# Patient Record
Sex: MALE | Race: WHITE | NOT HISPANIC OR LATINO | Employment: OTHER | URBAN - METROPOLITAN AREA
[De-identification: names, ages, dates, MRNs, and addresses within clinical notes are randomized per-mention and may not be internally consistent; named-entity substitution may affect disease eponyms.]

---

## 2021-08-27 ENCOUNTER — OFFICE VISIT (OUTPATIENT)
Dept: CARDIOLOGY | Facility: CLINIC | Age: 66
End: 2021-08-27

## 2021-08-27 VITALS
DIASTOLIC BLOOD PRESSURE: 89 MMHG | BODY MASS INDEX: 35.24 KG/M2 | SYSTOLIC BLOOD PRESSURE: 133 MMHG | WEIGHT: 224.5 LBS | HEIGHT: 67 IN | HEART RATE: 47 BPM | OXYGEN SATURATION: 98 %

## 2021-08-27 DIAGNOSIS — R06.02 SHORTNESS OF BREATH: ICD-10-CM

## 2021-08-27 DIAGNOSIS — G47.33 OBSTRUCTIVE SLEEP APNEA: ICD-10-CM

## 2021-08-27 DIAGNOSIS — I25.118 CORONARY ARTERY DISEASE OF NATIVE ARTERY OF NATIVE HEART WITH STABLE ANGINA PECTORIS (HCC): ICD-10-CM

## 2021-08-27 DIAGNOSIS — E78.2 MIXED HYPERLIPIDEMIA: ICD-10-CM

## 2021-08-27 DIAGNOSIS — I20.9 ANGINA PECTORIS (HCC): Primary | ICD-10-CM

## 2021-08-27 DIAGNOSIS — I10 ESSENTIAL HYPERTENSION: ICD-10-CM

## 2021-08-27 PROCEDURE — 93000 ELECTROCARDIOGRAM COMPLETE: CPT | Performed by: INTERNAL MEDICINE

## 2021-08-27 PROCEDURE — 99204 OFFICE O/P NEW MOD 45 MIN: CPT | Performed by: INTERNAL MEDICINE

## 2021-08-27 RX ORDER — OMEPRAZOLE 40 MG/1
CAPSULE, DELAYED RELEASE ORAL
COMMUNITY
Start: 2021-06-19 | End: 2022-04-04 | Stop reason: ALTCHOICE

## 2021-08-27 RX ORDER — BUPROPION HYDROCHLORIDE 300 MG/1
300 TABLET ORAL DAILY
COMMUNITY
Start: 2021-07-01

## 2021-08-27 RX ORDER — TEMAZEPAM 15 MG/1
15 CAPSULE ORAL
COMMUNITY
Start: 2021-08-05

## 2021-08-27 RX ORDER — BENAZEPRIL HYDROCHLORIDE 20 MG/1
1 TABLET ORAL DAILY
COMMUNITY
Start: 2021-06-19

## 2021-08-27 RX ORDER — FUROSEMIDE 20 MG/1
20 TABLET ORAL DAILY
COMMUNITY
Start: 2021-08-05

## 2021-08-27 RX ORDER — ASPIRIN 81 MG/1
81 TABLET ORAL DAILY
COMMUNITY

## 2021-08-27 RX ORDER — TAMSULOSIN HYDROCHLORIDE 0.4 MG/1
1 CAPSULE ORAL DAILY
COMMUNITY

## 2021-08-27 RX ORDER — METOPROLOL SUCCINATE 50 MG/1
50 TABLET, EXTENDED RELEASE ORAL DAILY
COMMUNITY
Start: 2021-08-06

## 2021-08-27 NOTE — PROGRESS NOTES
"    Subjective:     Encounter Date:08/27/2021      Patient ID: Manuel Rolle is a 66 y.o. male.    Chief Complaint: Consult  History of Present Illness 66-year-old white male with history of recent diagnosed coronary disease hypertension hyperlipidemia sleep apnea presents to my office for a new consultation and a second opinion.  Patient had recent cardiac catheterization the findings of which are not known yet but he had some blockages according to him.  Patient still continues to have symptoms of chest pain or shortness of breath especially with exertion relieved with rest.  Chest pain is mostly left-sided without any radiation.  No complains any PND orthopnea.  No palpitation dizziness syncope or swelling of the feet.  Patient initially had a CT scan which showed a very high calcium score and had a treadmill test which was not abnormal but he had to have cardiac intervention because of his symptoms.  He is taking his meds regularly.  He does not smoke.  /89 (BP Location: Left arm, Patient Position: Sitting, Cuff Size: Adult)   Pulse (!) 47   Ht 170.2 cm (67\")   Wt 102 kg (224 lb 8 oz)   SpO2 98%   BMI 35.16 kg/m²     The following portions of the patient's history were reviewed and updated as appropriate: allergies, current medications, past family history, past medical history, past social history, past surgical history and problem list.  Past Medical History:   Diagnosis Date   • Hypertension      Past Surgical History:   Procedure Laterality Date   • ANKLE SURGERY     • CARDIAC CATHETERIZATION     • COLONOSCOPY  2019   • HERNIA REPAIR       Social History     Socioeconomic History   • Marital status:      Spouse name: Not on file   • Number of children: Not on file   • Years of education: Not on file   • Highest education level: Not on file   Tobacco Use   • Smoking status: Former Smoker   • Smokeless tobacco: Never Used   Vaping Use   • Vaping Use: Never used   Substance and Sexual Activity "   • Alcohol use: Yes     Comment: 1-2 a week   • Drug use: Never   • Sexual activity: Defer     Family History   Problem Relation Age of Onset   • Heart disease Mother    • Hypertension Father        Current Outpatient Medications:   •  aspirin 81 MG EC tablet, Take 81 mg by mouth Daily., Disp: , Rfl:   •  benazepril (LOTENSIN) 20 MG tablet, Take 1 tablet by mouth Daily., Disp: , Rfl:   •  buPROPion XL (WELLBUTRIN XL) 300 MG 24 hr tablet, Take 300 mg by mouth Daily., Disp: , Rfl:   •  furosemide (LASIX) 20 MG tablet, Take 20 mg by mouth Daily., Disp: , Rfl:   •  metoprolol succinate XL (TOPROL-XL) 50 MG 24 hr tablet, Take 50 mg by mouth Daily., Disp: , Rfl:   •  omeprazole (priLOSEC) 40 MG capsule, , Disp: , Rfl:   •  tamsulosin (FLOMAX) 0.4 MG capsule 24 hr capsule, Take 1 capsule by mouth Daily., Disp: , Rfl:   •  temazepam (RESTORIL) 15 MG capsule, Take 15 mg by mouth every night at bedtime., Disp: , Rfl:   No Known Allergies    Review of Systems   Constitutional: Positive for malaise/fatigue. Negative for fever.   HENT: Negative for congestion and hearing loss.    Eyes: Negative for double vision and visual disturbance.   Cardiovascular: Positive for chest pain. Negative for claudication, dyspnea on exertion, leg swelling and syncope.   Respiratory: Positive for shortness of breath. Negative for cough.    Endocrine: Negative for cold intolerance.   Skin: Negative for color change and rash.   Musculoskeletal: Negative for arthritis and joint pain.   Gastrointestinal: Positive for nausea. Negative for abdominal pain and heartburn.   Genitourinary: Negative for hematuria.   Neurological: Positive for numbness (left arm). Negative for excessive daytime sleepiness and dizziness.   Psychiatric/Behavioral: Negative for depression. The patient is not nervous/anxious.    All other systems reviewed and are negative.             Objective:     Constitutional:       Appearance: Well-developed.   Eyes:      General: No  scleral icterus.     Conjunctiva/sclera: Conjunctivae normal.   HENT:      Head: Normocephalic and atraumatic.   Neck:      Vascular: No carotid bruit or JVD.   Pulmonary:      Effort: Pulmonary effort is normal.      Breath sounds: Normal breath sounds. No wheezing. No rales.   Cardiovascular:      Normal rate. Regular rhythm.   Pulses:     Intact distal pulses.   Abdominal:      General: Bowel sounds are normal.      Palpations: Abdomen is soft.   Musculoskeletal:      Cervical back: Normal range of motion and neck supple. Skin:     General: Skin is warm and dry.      Findings: No rash.   Neurological:      Mental Status: Alert.           ECG 12 Lead    Date/Time: 8/27/2021 3:04 PM  Performed by: Celso Nichole MD  Authorized by: Celso Nichole MD   Comments: Sinus bradycardia  Abnormal EKG  No previous EKG available            Lab Review:       Assessment:          Diagnosis Plan   1. Angina pectoris (CMS/HCC)     2. Coronary artery disease of native artery of native heart with stable angina pectoris (CMS/HCC)     3. Essential hypertension     4. Mixed hyperlipidemia     5. Obstructive sleep apnea     6. Shortness of breath            Plan:     Patient has been having symptoms of chest pain and shortness of breath and had had a cardiac intervention which showed disease  I will review his films and then decide about medical therapy versus stent placement  Patient is currently on medical therapy with aspirin and medications for blood pressure but is not on a statin  Patient will be placed on low-dose statins even if he is cholesterol is within normal meds because of his high calcium score and because of his family history  Patient has sleep apnea and will need a CPAP machine.

## 2021-08-31 ENCOUNTER — TELEPHONE (OUTPATIENT)
Dept: CARDIOLOGY | Facility: CLINIC | Age: 66
End: 2021-08-31

## 2022-03-29 ENCOUNTER — TELEPHONE (OUTPATIENT)
Dept: CARDIOLOGY | Facility: CLINIC | Age: 67
End: 2022-03-29

## 2022-03-31 ENCOUNTER — TRANSCRIBE ORDERS (OUTPATIENT)
Dept: ADMINISTRATIVE | Facility: HOSPITAL | Age: 67
End: 2022-03-31

## 2022-03-31 ENCOUNTER — LAB (OUTPATIENT)
Dept: LAB | Facility: HOSPITAL | Age: 67
End: 2022-03-31

## 2022-03-31 DIAGNOSIS — Z01.818 PRE-OP EXAMINATION: ICD-10-CM

## 2022-03-31 DIAGNOSIS — Z01.818 PRE-OP EXAMINATION: Primary | ICD-10-CM

## 2022-03-31 LAB
ANION GAP SERPL CALCULATED.3IONS-SCNC: 9 MMOL/L (ref 5–15)
BASOPHILS # BLD AUTO: 0.02 10*3/MM3 (ref 0–0.2)
BASOPHILS NFR BLD AUTO: 0.3 % (ref 0–1.5)
BUN SERPL-MCNC: 15 MG/DL (ref 8–23)
BUN/CREAT SERPL: 11.9 (ref 7–25)
CALCIUM SPEC-SCNC: 9.4 MG/DL (ref 8.6–10.5)
CHLORIDE SERPL-SCNC: 105 MMOL/L (ref 98–107)
CO2 SERPL-SCNC: 28 MMOL/L (ref 22–29)
CREAT SERPL-MCNC: 1.26 MG/DL (ref 0.76–1.27)
DEPRECATED RDW RBC AUTO: 45.1 FL (ref 37–54)
EGFRCR SERPLBLD CKD-EPI 2021: 62.9 ML/MIN/1.73
EOSINOPHIL # BLD AUTO: 0.13 10*3/MM3 (ref 0–0.4)
EOSINOPHIL NFR BLD AUTO: 1.7 % (ref 0.3–6.2)
ERYTHROCYTE [DISTWIDTH] IN BLOOD BY AUTOMATED COUNT: 14.4 % (ref 12.3–15.4)
GLUCOSE SERPL-MCNC: 80 MG/DL (ref 65–99)
HCT VFR BLD AUTO: 46.9 % (ref 37.5–51)
HGB BLD-MCNC: 15.1 G/DL (ref 13–17.7)
IMM GRANULOCYTES # BLD AUTO: 0.01 10*3/MM3 (ref 0–0.05)
IMM GRANULOCYTES NFR BLD AUTO: 0.1 % (ref 0–0.5)
LYMPHOCYTES # BLD AUTO: 2.18 10*3/MM3 (ref 0.7–3.1)
LYMPHOCYTES NFR BLD AUTO: 28.6 % (ref 19.6–45.3)
MCH RBC QN AUTO: 27.9 PG (ref 26.6–33)
MCHC RBC AUTO-ENTMCNC: 32.2 G/DL (ref 31.5–35.7)
MCV RBC AUTO: 86.5 FL (ref 79–97)
MONOCYTES # BLD AUTO: 0.67 10*3/MM3 (ref 0.1–0.9)
MONOCYTES NFR BLD AUTO: 8.8 % (ref 5–12)
NEUTROPHILS NFR BLD AUTO: 4.6 10*3/MM3 (ref 1.7–7)
NEUTROPHILS NFR BLD AUTO: 60.5 % (ref 42.7–76)
NRBC BLD AUTO-RTO: 0 /100 WBC (ref 0–0.2)
PLATELET # BLD AUTO: 176 10*3/MM3 (ref 140–450)
PMV BLD AUTO: 12.2 FL (ref 6–12)
POTASSIUM SERPL-SCNC: 4.4 MMOL/L (ref 3.5–5.2)
RBC # BLD AUTO: 5.42 10*6/MM3 (ref 4.14–5.8)
SODIUM SERPL-SCNC: 142 MMOL/L (ref 136–145)
WBC NRBC COR # BLD: 7.61 10*3/MM3 (ref 3.4–10.8)

## 2022-03-31 PROCEDURE — 85025 COMPLETE CBC W/AUTO DIFF WBC: CPT

## 2022-03-31 PROCEDURE — 36415 COLL VENOUS BLD VENIPUNCTURE: CPT

## 2022-03-31 PROCEDURE — 80048 BASIC METABOLIC PNL TOTAL CA: CPT

## 2022-04-04 ENCOUNTER — OFFICE VISIT (OUTPATIENT)
Dept: CARDIOLOGY | Facility: CLINIC | Age: 67
End: 2022-04-04

## 2022-04-04 VITALS
DIASTOLIC BLOOD PRESSURE: 91 MMHG | BODY MASS INDEX: 35.9 KG/M2 | HEART RATE: 50 BPM | WEIGHT: 228.75 LBS | SYSTOLIC BLOOD PRESSURE: 147 MMHG | OXYGEN SATURATION: 97 % | HEIGHT: 67 IN

## 2022-04-04 DIAGNOSIS — I25.10 CORONARY ARTERY DISEASE INVOLVING NATIVE CORONARY ARTERY OF NATIVE HEART WITHOUT ANGINA PECTORIS: Primary | ICD-10-CM

## 2022-04-04 DIAGNOSIS — G47.33 OBSTRUCTIVE SLEEP APNEA: ICD-10-CM

## 2022-04-04 DIAGNOSIS — I10 PRIMARY HYPERTENSION: ICD-10-CM

## 2022-04-04 PROCEDURE — 93000 ELECTROCARDIOGRAM COMPLETE: CPT | Performed by: INTERNAL MEDICINE

## 2022-04-04 PROCEDURE — 99213 OFFICE O/P EST LOW 20 MIN: CPT | Performed by: INTERNAL MEDICINE

## 2022-04-04 RX ORDER — MELOXICAM 15 MG/1
15 TABLET ORAL DAILY
COMMUNITY
Start: 2022-03-29 | End: 2022-04-26 | Stop reason: HOSPADM

## 2022-04-04 RX ORDER — METHOCARBAMOL 750 MG/1
750 TABLET, FILM COATED ORAL 4 TIMES DAILY PRN
COMMUNITY
Start: 2022-03-29

## 2022-04-04 RX ORDER — SODIUM PHOSPHATE,MONO-DIBASIC 19G-7G/118
ENEMA (ML) RECTAL
COMMUNITY

## 2022-04-04 RX ORDER — PANTOPRAZOLE SODIUM 40 MG/1
40 TABLET, DELAYED RELEASE ORAL DAILY
COMMUNITY
Start: 2022-02-02

## 2022-04-04 NOTE — PROGRESS NOTES
"    Subjective:     Encounter Date:04/04/2022      Patient ID: Manuel Rolle is a 66 y.o. male.    Chief Complaint:  History of Present Illness 66-year-old white male with history of coronary disease history of hypertension presents to my office for follow-up.  Patient is currently stable without any symptoms of chest pain or shortness of breath at rest or exertion.  No complains of any PND orthopnea.  No palpitation dizziness syncope he has some swelling of the feet but he is taking his medicine regularly but he does not smoke.    The following portions of the patient's history were reviewed and updated as appropriate: allergies, current medications, past family history, past medical history, past social history, past surgical history and problem list.  Past Medical History:   Diagnosis Date   • Hypertension      Past Surgical History:   Procedure Laterality Date   • ANKLE SURGERY     • CARDIAC CATHETERIZATION     • CATARACT EXTRACTION Left    • COLONOSCOPY  2019   • HERNIA REPAIR       /91 (BP Location: Right arm, Patient Position: Sitting, Cuff Size: Adult)   Pulse 50   Ht 170.2 cm (67\")   Wt 104 kg (228 lb 12 oz)   SpO2 97%   BMI 35.83 kg/m²   Family History   Problem Relation Age of Onset   • Heart disease Mother    • Hypertension Father        Current Outpatient Medications:   •  aspirin 81 MG EC tablet, Take 81 mg by mouth Daily., Disp: , Rfl:   •  benazepril (LOTENSIN) 20 MG tablet, Take 1 tablet by mouth Daily., Disp: , Rfl:   •  furosemide (LASIX) 20 MG tablet, Take 20 mg by mouth Daily., Disp: , Rfl:   •  glucosamine sulfate 500 MG capsule capsule, Take  by mouth 3 (Three) Times a Day With Meals., Disp: , Rfl:   •  meloxicam (MOBIC) 7.5 MG tablet, Take 7.5 mg by mouth Daily., Disp: , Rfl:   •  methocarbamol (ROBAXIN) 750 MG tablet, TAKE 1 TABLET BY ORAL ROUTE 4 TIMES EVERY DAY AS NEEDED FOR MUSCLE SPASM, Disp: , Rfl:   •  metoprolol succinate XL (TOPROL-XL) 50 MG 24 hr tablet, Take 50 mg by " mouth Daily., Disp: , Rfl:   •  pantoprazole (PROTONIX) 40 MG EC tablet, Take 40 mg by mouth Daily., Disp: , Rfl:   •  tamsulosin (FLOMAX) 0.4 MG capsule 24 hr capsule, Take 1 capsule by mouth Daily., Disp: , Rfl:   •  temazepam (RESTORIL) 15 MG capsule, Take 15 mg by mouth every night at bedtime., Disp: , Rfl:   •  buPROPion XL (WELLBUTRIN XL) 300 MG 24 hr tablet, Take 300 mg by mouth Daily., Disp: , Rfl:   No Known Allergies  Social History     Socioeconomic History   • Marital status:    Tobacco Use   • Smoking status: Former Smoker   • Smokeless tobacco: Never Used   Vaping Use   • Vaping Use: Never used   Substance and Sexual Activity   • Alcohol use: Yes     Comment: 1-2 a week   • Drug use: Never   • Sexual activity: Defer     Review of Systems   Constitutional: Negative for fever and malaise/fatigue.   Cardiovascular: Positive for leg swelling. Negative for chest pain, dyspnea on exertion and palpitations.   Respiratory: Negative for cough and shortness of breath.    Skin: Negative for rash.   Gastrointestinal: Negative for abdominal pain, nausea and vomiting.   Neurological: Negative for focal weakness and headaches.   All other systems reviewed and are negative.             Objective:     Constitutional:       Appearance: Well-developed.   Eyes:      General: No scleral icterus.     Conjunctiva/sclera: Conjunctivae normal.   HENT:      Head: Normocephalic and atraumatic.   Neck:      Vascular: No carotid bruit or JVD.   Pulmonary:      Effort: Pulmonary effort is normal.      Breath sounds: Normal breath sounds. No wheezing. No rales.   Cardiovascular:      Normal rate. Regular rhythm.   Pulses:     Intact distal pulses.   Abdominal:      General: Bowel sounds are normal.      Palpations: Abdomen is soft.   Musculoskeletal:      Cervical back: Normal range of motion and neck supple. Skin:     General: Skin is warm and dry.      Findings: No rash.   Neurological:      Mental Status: Alert.          ECG 12 Lead    Date/Time: 4/4/2022 3:27 PM  Performed by: Celso Nichole MD  Authorized by: Celso Nichole MD   Comments: Sinus rhythm with sinus arrhythmia  Normal ECG  No previous ECGs available            Lab Review:         MDM  1.  Coronary disease  Patient has nonobstructive disease with a 50+ disease in the LAD and a 70 wound is in a very small diagonal branch and has normal LV systolic function is currently stable on medical therapy  2.  Hypertension  Patient blood pressure currently stable on benazepril and metoprolol  3.  Obstructive sleep apnea  Venous sleep apnea uses a CPAP machine.    Patient's previous medical records, labs, and EKG were reviewed and discussed with the patient at today's visit.

## 2022-04-06 ENCOUNTER — LAB (OUTPATIENT)
Dept: LAB | Facility: HOSPITAL | Age: 67
End: 2022-04-06

## 2022-04-06 ENCOUNTER — TRANSCRIBE ORDERS (OUTPATIENT)
Dept: ADMINISTRATIVE | Facility: HOSPITAL | Age: 67
End: 2022-04-06

## 2022-04-06 DIAGNOSIS — Z01.818 PRE-OP TESTING: ICD-10-CM

## 2022-04-06 DIAGNOSIS — Z01.818 PRE-OP TESTING: Primary | ICD-10-CM

## 2022-04-06 LAB — SARS-COV-2 ORF1AB RESP QL NAA+PROBE: NOT DETECTED

## 2022-04-06 PROCEDURE — C9803 HOPD COVID-19 SPEC COLLECT: HCPCS

## 2022-04-06 PROCEDURE — U0004 COV-19 TEST NON-CDC HGH THRU: HCPCS

## 2022-04-22 ENCOUNTER — HOSPITAL ENCOUNTER (INPATIENT)
Facility: HOSPITAL | Age: 67
LOS: 4 days | Discharge: HOME | End: 2022-04-26
Attending: EMERGENCY MEDICINE | Admitting: STUDENT IN AN ORGANIZED HEALTH CARE EDUCATION/TRAINING PROGRAM

## 2022-04-22 ENCOUNTER — APPOINTMENT (OUTPATIENT)
Dept: CT IMAGING | Facility: HOSPITAL | Age: 67
End: 2022-04-22

## 2022-04-22 DIAGNOSIS — R06.00 DYSPNEA, UNSPECIFIED TYPE: ICD-10-CM

## 2022-04-22 DIAGNOSIS — I26.94 MULTIPLE SUBSEGMENTAL PULMONARY EMBOLI WITHOUT ACUTE COR PULMONALE: Primary | ICD-10-CM

## 2022-04-22 DIAGNOSIS — R07.9 CHEST PAIN, UNSPECIFIED TYPE: ICD-10-CM

## 2022-04-22 LAB
ALBUMIN SERPL-MCNC: 3.9 G/DL (ref 3.5–5.2)
ALBUMIN/GLOB SERPL: 1.2 G/DL
ALP SERPL-CCNC: 83 U/L (ref 39–117)
ALT SERPL W P-5'-P-CCNC: 14 U/L (ref 1–41)
ANION GAP SERPL CALCULATED.3IONS-SCNC: 13 MMOL/L (ref 5–15)
APTT PPP: 29.1 SECONDS (ref 61–76.5)
APTT PPP: 69.3 SECONDS (ref 61–76.5)
AST SERPL-CCNC: 16 U/L (ref 1–40)
BASOPHILS # BLD AUTO: 0 10*3/MM3 (ref 0–0.2)
BASOPHILS NFR BLD AUTO: 0.4 % (ref 0–1.5)
BILIRUB SERPL-MCNC: 0.7 MG/DL (ref 0–1.2)
BUN SERPL-MCNC: 22 MG/DL (ref 8–23)
BUN/CREAT SERPL: 19.6 (ref 7–25)
CALCIUM SPEC-SCNC: 8.6 MG/DL (ref 8.6–10.5)
CHLORIDE SERPL-SCNC: 102 MMOL/L (ref 98–107)
CO2 SERPL-SCNC: 24 MMOL/L (ref 22–29)
CREAT SERPL-MCNC: 1.12 MG/DL (ref 0.76–1.27)
D DIMER PPP FEU-MCNC: 8.81 MG/L (FEU) (ref 0–0.59)
DEPRECATED RDW RBC AUTO: 47.3 FL (ref 37–54)
EGFRCR SERPLBLD CKD-EPI 2021: 72.5 ML/MIN/1.73
EOSINOPHIL # BLD AUTO: 0 10*3/MM3 (ref 0–0.4)
EOSINOPHIL NFR BLD AUTO: 0.5 % (ref 0.3–6.2)
ERYTHROCYTE [DISTWIDTH] IN BLOOD BY AUTOMATED COUNT: 16.1 % (ref 12.3–15.4)
GLOBULIN UR ELPH-MCNC: 3.2 GM/DL
GLUCOSE SERPL-MCNC: 96 MG/DL (ref 65–99)
HCT VFR BLD AUTO: 46 % (ref 37.5–51)
HGB BLD-MCNC: 15 G/DL (ref 13–17.7)
HOLD SPECIMEN: NORMAL
INR PPP: 1.05 (ref 0.93–1.1)
LYMPHOCYTES # BLD AUTO: 1.6 10*3/MM3 (ref 0.7–3.1)
LYMPHOCYTES NFR BLD AUTO: 18.3 % (ref 19.6–45.3)
MCH RBC QN AUTO: 27.4 PG (ref 26.6–33)
MCHC RBC AUTO-ENTMCNC: 32.5 G/DL (ref 31.5–35.7)
MCV RBC AUTO: 84.4 FL (ref 79–97)
MONOCYTES # BLD AUTO: 0.8 10*3/MM3 (ref 0.1–0.9)
MONOCYTES NFR BLD AUTO: 8.9 % (ref 5–12)
NEUTROPHILS NFR BLD AUTO: 6.5 10*3/MM3 (ref 1.7–7)
NEUTROPHILS NFR BLD AUTO: 71.9 % (ref 42.7–76)
NRBC BLD AUTO-RTO: 0 /100 WBC (ref 0–0.2)
NT-PROBNP SERPL-MCNC: 437 PG/ML (ref 0–900)
PLATELET # BLD AUTO: 116 10*3/MM3 (ref 140–450)
PMV BLD AUTO: 8.3 FL (ref 6–12)
POTASSIUM SERPL-SCNC: 4.2 MMOL/L (ref 3.5–5.2)
PROT SERPL-MCNC: 7.1 G/DL (ref 6–8.5)
PROTHROMBIN TIME: 10.8 SECONDS (ref 9.6–11.7)
RBC # BLD AUTO: 5.45 10*6/MM3 (ref 4.14–5.8)
SODIUM SERPL-SCNC: 139 MMOL/L (ref 136–145)
TROPONIN T SERPL-MCNC: <0.01 NG/ML (ref 0–0.03)
TROPONIN T SERPL-MCNC: <0.01 NG/ML (ref 0–0.03)
TSH SERPL DL<=0.05 MIU/L-ACNC: 0.55 UIU/ML (ref 0.27–4.2)
WBC NRBC COR # BLD: 9 10*3/MM3 (ref 3.4–10.8)

## 2022-04-22 PROCEDURE — 84484 ASSAY OF TROPONIN QUANT: CPT | Performed by: NURSE PRACTITIONER

## 2022-04-22 PROCEDURE — 71275 CT ANGIOGRAPHY CHEST: CPT

## 2022-04-22 PROCEDURE — 87040 BLOOD CULTURE FOR BACTERIA: CPT | Performed by: NURSE PRACTITIONER

## 2022-04-22 PROCEDURE — 84443 ASSAY THYROID STIM HORMONE: CPT | Performed by: NURSE PRACTITIONER

## 2022-04-22 PROCEDURE — 86038 ANTINUCLEAR ANTIBODIES: CPT | Performed by: INTERNAL MEDICINE

## 2022-04-22 PROCEDURE — 83880 ASSAY OF NATRIURETIC PEPTIDE: CPT | Performed by: NURSE PRACTITIONER

## 2022-04-22 PROCEDURE — 25010000002 ONDANSETRON PER 1 MG: Performed by: NURSE PRACTITIONER

## 2022-04-22 PROCEDURE — 85730 THROMBOPLASTIN TIME PARTIAL: CPT | Performed by: NURSE PRACTITIONER

## 2022-04-22 PROCEDURE — 85025 COMPLETE CBC W/AUTO DIFF WBC: CPT | Performed by: NURSE PRACTITIONER

## 2022-04-22 PROCEDURE — 85730 THROMBOPLASTIN TIME PARTIAL: CPT | Performed by: INTERNAL MEDICINE

## 2022-04-22 PROCEDURE — 99223 1ST HOSP IP/OBS HIGH 75: CPT | Performed by: INTERNAL MEDICINE

## 2022-04-22 PROCEDURE — 85610 PROTHROMBIN TIME: CPT | Performed by: NURSE PRACTITIONER

## 2022-04-22 PROCEDURE — 99284 EMERGENCY DEPT VISIT MOD MDM: CPT

## 2022-04-22 PROCEDURE — 25010000002 HYDROMORPHONE 1 MG/ML SOLUTION: Performed by: NURSE PRACTITIONER

## 2022-04-22 PROCEDURE — 0 MORPHINE SULFATE 4 MG/ML SOLUTION: Performed by: NURSE PRACTITIONER

## 2022-04-22 PROCEDURE — 80053 COMPREHEN METABOLIC PANEL: CPT | Performed by: NURSE PRACTITIONER

## 2022-04-22 PROCEDURE — 85379 FIBRIN DEGRADATION QUANT: CPT | Performed by: NURSE PRACTITIONER

## 2022-04-22 PROCEDURE — 36415 COLL VENOUS BLD VENIPUNCTURE: CPT

## 2022-04-22 PROCEDURE — 36415 COLL VENOUS BLD VENIPUNCTURE: CPT | Performed by: NURSE PRACTITIONER

## 2022-04-22 PROCEDURE — 0 IOPAMIDOL PER 1 ML: Performed by: EMERGENCY MEDICINE

## 2022-04-22 PROCEDURE — 93005 ELECTROCARDIOGRAM TRACING: CPT | Performed by: NURSE PRACTITIONER

## 2022-04-22 PROCEDURE — 25010000002 HEPARIN (PORCINE) 25000-0.45 UT/250ML-% SOLUTION: Performed by: NURSE PRACTITIONER

## 2022-04-22 RX ORDER — MORPHINE SULFATE 4 MG/ML
4 INJECTION, SOLUTION INTRAMUSCULAR; INTRAVENOUS ONCE
Status: COMPLETED | OUTPATIENT
Start: 2022-04-22 | End: 2022-04-22

## 2022-04-22 RX ORDER — CHOLECALCIFEROL (VITAMIN D3) 125 MCG
5 CAPSULE ORAL NIGHTLY PRN
Status: DISCONTINUED | OUTPATIENT
Start: 2022-04-22 | End: 2022-04-26 | Stop reason: HOSPADM

## 2022-04-22 RX ORDER — ONDANSETRON 2 MG/ML
4 INJECTION INTRAMUSCULAR; INTRAVENOUS EVERY 6 HOURS PRN
Status: DISCONTINUED | OUTPATIENT
Start: 2022-04-22 | End: 2022-04-26 | Stop reason: HOSPADM

## 2022-04-22 RX ORDER — HEPARIN SODIUM 10000 [USP'U]/100ML
15 INJECTION, SOLUTION INTRAVENOUS
Status: DISCONTINUED | OUTPATIENT
Start: 2022-04-22 | End: 2022-04-24 | Stop reason: ALTCHOICE

## 2022-04-22 RX ORDER — HYDROCODONE BITARTRATE AND ACETAMINOPHEN 7.5; 325 MG/1; MG/1
1 TABLET ORAL EVERY 6 HOURS PRN
Status: DISCONTINUED | OUTPATIENT
Start: 2022-04-22 | End: 2022-04-23

## 2022-04-22 RX ORDER — ALUMINA, MAGNESIA, AND SIMETHICONE 2400; 2400; 240 MG/30ML; MG/30ML; MG/30ML
15 SUSPENSION ORAL EVERY 6 HOURS PRN
Status: DISCONTINUED | OUTPATIENT
Start: 2022-04-22 | End: 2022-04-26 | Stop reason: HOSPADM

## 2022-04-22 RX ORDER — SODIUM CHLORIDE 0.9 % (FLUSH) 0.9 %
10 SYRINGE (ML) INJECTION AS NEEDED
Status: DISCONTINUED | OUTPATIENT
Start: 2022-04-22 | End: 2022-04-26 | Stop reason: HOSPADM

## 2022-04-22 RX ORDER — ALBUTEROL SULFATE 90 UG/1
2 AEROSOL, METERED RESPIRATORY (INHALATION) EVERY 4 HOURS PRN
COMMUNITY

## 2022-04-22 RX ORDER — MORPHINE SULFATE 4 MG/ML
2 INJECTION, SOLUTION INTRAMUSCULAR; INTRAVENOUS EVERY 4 HOURS PRN
Status: DISCONTINUED | OUTPATIENT
Start: 2022-04-22 | End: 2022-04-23

## 2022-04-22 RX ORDER — ACETAMINOPHEN 650 MG/1
650 SUPPOSITORY RECTAL EVERY 4 HOURS PRN
Status: DISCONTINUED | OUTPATIENT
Start: 2022-04-22 | End: 2022-04-26 | Stop reason: HOSPADM

## 2022-04-22 RX ORDER — TERBINAFINE HYDROCHLORIDE 250 MG/1
250 TABLET ORAL DAILY
COMMUNITY
Start: 2022-04-20

## 2022-04-22 RX ORDER — MORPHINE SULFATE 4 MG/ML
4 INJECTION, SOLUTION INTRAMUSCULAR; INTRAVENOUS EVERY 4 HOURS PRN
Status: DISCONTINUED | OUTPATIENT
Start: 2022-04-22 | End: 2022-04-22

## 2022-04-22 RX ORDER — ONDANSETRON 2 MG/ML
4 INJECTION INTRAMUSCULAR; INTRAVENOUS ONCE
Status: COMPLETED | OUTPATIENT
Start: 2022-04-22 | End: 2022-04-22

## 2022-04-22 RX ORDER — HYDROCODONE BITARTRATE AND ACETAMINOPHEN 7.5; 325 MG/1; MG/1
1 TABLET ORAL EVERY 6 HOURS PRN
Status: DISCONTINUED | OUTPATIENT
Start: 2022-04-22 | End: 2022-04-22 | Stop reason: SDUPTHER

## 2022-04-22 RX ORDER — ATORVASTATIN CALCIUM 20 MG/1
20 TABLET, FILM COATED ORAL DAILY
COMMUNITY

## 2022-04-22 RX ORDER — GABAPENTIN 300 MG/1
300 CAPSULE ORAL 3 TIMES DAILY
COMMUNITY

## 2022-04-22 RX ORDER — MORPHINE SULFATE 4 MG/ML
4 INJECTION, SOLUTION INTRAMUSCULAR; INTRAVENOUS
Status: DISCONTINUED | OUTPATIENT
Start: 2022-04-22 | End: 2022-04-23

## 2022-04-22 RX ORDER — ATORVASTATIN CALCIUM 20 MG/1
20 TABLET, FILM COATED ORAL DAILY
Status: CANCELLED | OUTPATIENT
Start: 2022-04-22

## 2022-04-22 RX ORDER — ONDANSETRON 4 MG/1
4 TABLET, FILM COATED ORAL EVERY 6 HOURS PRN
Status: DISCONTINUED | OUTPATIENT
Start: 2022-04-22 | End: 2022-04-26 | Stop reason: HOSPADM

## 2022-04-22 RX ORDER — ACETAMINOPHEN 160 MG/5ML
650 SOLUTION ORAL EVERY 4 HOURS PRN
Status: DISCONTINUED | OUTPATIENT
Start: 2022-04-22 | End: 2022-04-26 | Stop reason: HOSPADM

## 2022-04-22 RX ORDER — ALPRAZOLAM 0.5 MG/1
0.5 TABLET ORAL 2 TIMES DAILY PRN
Status: DISCONTINUED | OUTPATIENT
Start: 2022-04-22 | End: 2022-04-26 | Stop reason: HOSPADM

## 2022-04-22 RX ORDER — SODIUM CHLORIDE 0.9 % (FLUSH) 0.9 %
10 SYRINGE (ML) INJECTION EVERY 12 HOURS SCHEDULED
Status: DISCONTINUED | OUTPATIENT
Start: 2022-04-22 | End: 2022-04-26 | Stop reason: HOSPADM

## 2022-04-22 RX ORDER — GABAPENTIN 300 MG/1
300 CAPSULE ORAL 3 TIMES DAILY
Status: CANCELLED | OUTPATIENT
Start: 2022-04-22

## 2022-04-22 RX ORDER — ACETAMINOPHEN 325 MG/1
650 TABLET ORAL EVERY 4 HOURS PRN
Status: DISCONTINUED | OUTPATIENT
Start: 2022-04-22 | End: 2022-04-26 | Stop reason: HOSPADM

## 2022-04-22 RX ADMIN — IOPAMIDOL 100 ML: 755 INJECTION, SOLUTION INTRAVENOUS at 16:59

## 2022-04-22 RX ADMIN — MORPHINE SULFATE 4 MG: 4 INJECTION INTRAVENOUS at 21:50

## 2022-04-22 RX ADMIN — ALPRAZOLAM 0.5 MG: 0.5 TABLET ORAL at 22:11

## 2022-04-22 RX ADMIN — HYDROCODONE BITARTRATE AND ACETAMINOPHEN 1 TABLET: 7.5; 325 TABLET ORAL at 20:15

## 2022-04-22 RX ADMIN — MORPHINE SULFATE 4 MG: 4 INJECTION INTRAVENOUS at 18:47

## 2022-04-22 RX ADMIN — MORPHINE SULFATE 4 MG: 4 INJECTION INTRAVENOUS at 16:16

## 2022-04-22 RX ADMIN — HYDROMORPHONE HYDROCHLORIDE 1 MG: 1 INJECTION, SOLUTION INTRAMUSCULAR; INTRAVENOUS; SUBCUTANEOUS at 17:39

## 2022-04-22 RX ADMIN — Medication 5 MG: at 22:11

## 2022-04-22 RX ADMIN — ONDANSETRON 4 MG: 2 INJECTION INTRAMUSCULAR; INTRAVENOUS at 16:15

## 2022-04-22 RX ADMIN — MORPHINE SULFATE 4 MG: 4 INJECTION INTRAVENOUS at 16:32

## 2022-04-22 RX ADMIN — HEPARIN SODIUM 15 UNITS/KG/HR: 10000 INJECTION, SOLUTION INTRAVENOUS at 17:40

## 2022-04-23 ENCOUNTER — APPOINTMENT (OUTPATIENT)
Dept: CARDIOLOGY | Facility: HOSPITAL | Age: 67
End: 2022-04-23

## 2022-04-23 LAB
ALBUMIN SERPL-MCNC: 4 G/DL (ref 3.5–5.2)
ALBUMIN/GLOB SERPL: 1.3 G/DL
ALP SERPL-CCNC: 83 U/L (ref 39–117)
ALT SERPL W P-5'-P-CCNC: 13 U/L (ref 1–41)
ANION GAP SERPL CALCULATED.3IONS-SCNC: 12 MMOL/L (ref 5–15)
APTT PPP: 63.8 SECONDS (ref 61–76.5)
AST SERPL-CCNC: 19 U/L (ref 1–40)
BASOPHILS # BLD AUTO: 0 10*3/MM3 (ref 0–0.2)
BASOPHILS NFR BLD AUTO: 0.1 % (ref 0–1.5)
BH CV ECHO MEAS - ACS: 2.48 CM
BH CV ECHO MEAS - AO MAX PG: 3.3 MMHG
BH CV ECHO MEAS - AO MEAN PG: 1.95 MMHG
BH CV ECHO MEAS - AO ROOT DIAM: 3.8 CM
BH CV ECHO MEAS - AO V2 MAX: 90.3 CM/SEC
BH CV ECHO MEAS - AO V2 VTI: 18.9 CM
BH CV ECHO MEAS - AVA(I,D): 5.9 CM2
BH CV ECHO MEAS - EDV(CUBED): 118.1 ML
BH CV ECHO MEAS - EDV(MOD-SP4): 59.4 ML
BH CV ECHO MEAS - EF(MOD-BP): 59 %
BH CV ECHO MEAS - EF(MOD-SP4): 59.2 %
BH CV ECHO MEAS - ESV(CUBED): 47.8 ML
BH CV ECHO MEAS - ESV(MOD-SP4): 24.2 ML
BH CV ECHO MEAS - FS: 26 %
BH CV ECHO MEAS - IVS/LVPW: 0.89 CM
BH CV ECHO MEAS - IVSD: 1.38 CM
BH CV ECHO MEAS - LA DIMENSION(2D): 3.9 CM
BH CV ECHO MEAS - LV DIASTOLIC VOL/BSA (35-75): 27.9 CM2
BH CV ECHO MEAS - LV MASS(C)D: 303.9 GRAMS
BH CV ECHO MEAS - LV MAX PG: 3.6 MMHG
BH CV ECHO MEAS - LV MEAN PG: 1.77 MMHG
BH CV ECHO MEAS - LV SYSTOLIC VOL/BSA (12-30): 11.4 CM2
BH CV ECHO MEAS - LV V1 MAX: 95.3 CM/SEC
BH CV ECHO MEAS - LV V1 VTI: 22.3 CM
BH CV ECHO MEAS - LVIDD: 4.9 CM
BH CV ECHO MEAS - LVIDS: 3.6 CM
BH CV ECHO MEAS - LVOT AREA: 5 CM2
BH CV ECHO MEAS - LVOT DIAM: 2.5 CM
BH CV ECHO MEAS - LVPWD: 1.55 CM
BH CV ECHO MEAS - MV A MAX VEL: 62.7 CM/SEC
BH CV ECHO MEAS - MV DEC SLOPE: 358.2 CM/SEC2
BH CV ECHO MEAS - MV DEC TIME: 0.24 MSEC
BH CV ECHO MEAS - MV E MAX VEL: 85.6 CM/SEC
BH CV ECHO MEAS - MV E/A: 1.37
BH CV ECHO MEAS - MV MAX PG: 2.45 MMHG
BH CV ECHO MEAS - MV MEAN PG: 0.89 MMHG
BH CV ECHO MEAS - MV V2 VTI: 29.3 CM
BH CV ECHO MEAS - MVA(VTI): 3.8 CM2
BH CV ECHO MEAS - PA ACC TIME: 0.07 SEC
BH CV ECHO MEAS - PA PR(ACCEL): 48.2 MMHG
BH CV ECHO MEAS - PA V2 MAX: 78.4 CM/SEC
BH CV ECHO MEAS - RAP SYSTOLE: 3 MMHG
BH CV ECHO MEAS - RV MAX PG: 2.17 MMHG
BH CV ECHO MEAS - RV V1 MAX: 73.6 CM/SEC
BH CV ECHO MEAS - RV V1 VTI: 19.3 CM
BH CV ECHO MEAS - RVDD: 3.7 CM
BH CV ECHO MEAS - RVOT DIAM: 2.37 CM
BH CV ECHO MEAS - RVSP: 40.5 MMHG
BH CV ECHO MEAS - SI(MOD-SP4): 16.5 ML/M2
BH CV ECHO MEAS - SV(LVOT): 111.8 ML
BH CV ECHO MEAS - SV(MOD-SP4): 35.2 ML
BH CV ECHO MEAS - SV(RVOT): 84.8 ML
BH CV ECHO MEAS - TR MAX PG: 37.5 MMHG
BH CV ECHO MEAS - TR MAX VEL: 306.3 CM/SEC
BILIRUB SERPL-MCNC: 0.4 MG/DL (ref 0–1.2)
BUN SERPL-MCNC: 26 MG/DL (ref 8–23)
BUN/CREAT SERPL: 17.1 (ref 7–25)
CALCIUM SPEC-SCNC: 8.7 MG/DL (ref 8.6–10.5)
CHLORIDE SERPL-SCNC: 97 MMOL/L (ref 98–107)
CO2 SERPL-SCNC: 25 MMOL/L (ref 22–29)
CREAT SERPL-MCNC: 1.52 MG/DL (ref 0.76–1.27)
DEPRECATED RDW RBC AUTO: 48.6 FL (ref 37–54)
EGFRCR SERPLBLD CKD-EPI 2021: 50.2 ML/MIN/1.73
EOSINOPHIL # BLD AUTO: 0.2 10*3/MM3 (ref 0–0.4)
EOSINOPHIL NFR BLD AUTO: 1.6 % (ref 0.3–6.2)
ERYTHROCYTE [DISTWIDTH] IN BLOOD BY AUTOMATED COUNT: 16.2 % (ref 12.3–15.4)
GLOBULIN UR ELPH-MCNC: 3.1 GM/DL
GLUCOSE SERPL-MCNC: 103 MG/DL (ref 65–99)
HCT VFR BLD AUTO: 43.3 % (ref 37.5–51)
HGB BLD-MCNC: 13.6 G/DL (ref 13–17.7)
LYMPHOCYTES # BLD AUTO: 2.2 10*3/MM3 (ref 0.7–3.1)
LYMPHOCYTES NFR BLD AUTO: 22 % (ref 19.6–45.3)
MAXIMAL PREDICTED HEART RATE: 154 BPM
MCH RBC QN AUTO: 27.1 PG (ref 26.6–33)
MCHC RBC AUTO-ENTMCNC: 31.4 G/DL (ref 31.5–35.7)
MCV RBC AUTO: 86.3 FL (ref 79–97)
MONOCYTES # BLD AUTO: 1 10*3/MM3 (ref 0.1–0.9)
MONOCYTES NFR BLD AUTO: 10.4 % (ref 5–12)
NEUTROPHILS NFR BLD AUTO: 6.4 10*3/MM3 (ref 1.7–7)
NEUTROPHILS NFR BLD AUTO: 65.9 % (ref 42.7–76)
NRBC BLD AUTO-RTO: 0 /100 WBC (ref 0–0.2)
PLATELET # BLD AUTO: 138 10*3/MM3 (ref 140–450)
PMV BLD AUTO: 9 FL (ref 6–12)
POTASSIUM SERPL-SCNC: 4.3 MMOL/L (ref 3.5–5.2)
PROT SERPL-MCNC: 7.1 G/DL (ref 6–8.5)
QT INTERVAL: 436 MS
RBC # BLD AUTO: 5.02 10*6/MM3 (ref 4.14–5.8)
SODIUM SERPL-SCNC: 134 MMOL/L (ref 136–145)
STRESS TARGET HR: 131 BPM
WBC NRBC COR # BLD: 9.8 10*3/MM3 (ref 3.4–10.8)

## 2022-04-23 PROCEDURE — 0 MORPHINE SULFATE 4 MG/ML SOLUTION: Performed by: NURSE PRACTITIONER

## 2022-04-23 PROCEDURE — 25010000002 HEPARIN (PORCINE) 25000-0.45 UT/250ML-% SOLUTION: Performed by: NURSE PRACTITIONER

## 2022-04-23 PROCEDURE — 83520 IMMUNOASSAY QUANT NOS NONAB: CPT | Performed by: INTERNAL MEDICINE

## 2022-04-23 PROCEDURE — 85220 BLOOC CLOT FACTOR V TEST: CPT | Performed by: INTERNAL MEDICINE

## 2022-04-23 PROCEDURE — 86148 ANTI-PHOSPHOLIPID ANTIBODY: CPT | Performed by: INTERNAL MEDICINE

## 2022-04-23 PROCEDURE — 85300 ANTITHROMBIN III ACTIVITY: CPT | Performed by: INTERNAL MEDICINE

## 2022-04-23 PROCEDURE — 85670 THROMBIN TIME PLASMA: CPT | Performed by: INTERNAL MEDICINE

## 2022-04-23 PROCEDURE — 86147 CARDIOLIPIN ANTIBODY EA IG: CPT | Performed by: INTERNAL MEDICINE

## 2022-04-23 PROCEDURE — 25010000002 HYDROMORPHONE 1 MG/ML SOLUTION: Performed by: INTERNAL MEDICINE

## 2022-04-23 PROCEDURE — 85306 CLOT INHIBIT PROT S FREE: CPT | Performed by: INTERNAL MEDICINE

## 2022-04-23 PROCEDURE — 93306 TTE W/DOPPLER COMPLETE: CPT

## 2022-04-23 PROCEDURE — 85025 COMPLETE CBC W/AUTO DIFF WBC: CPT | Performed by: INTERNAL MEDICINE

## 2022-04-23 PROCEDURE — 85732 THROMBOPLASTIN TIME PARTIAL: CPT | Performed by: INTERNAL MEDICINE

## 2022-04-23 PROCEDURE — 85305 CLOT INHIBIT PROT S TOTAL: CPT | Performed by: INTERNAL MEDICINE

## 2022-04-23 PROCEDURE — 83090 ASSAY OF HOMOCYSTEINE: CPT | Performed by: INTERNAL MEDICINE

## 2022-04-23 PROCEDURE — 25010000002 ONDANSETRON PER 1 MG: Performed by: INTERNAL MEDICINE

## 2022-04-23 PROCEDURE — 85303 CLOT INHIBIT PROT C ACTIVITY: CPT | Performed by: INTERNAL MEDICINE

## 2022-04-23 PROCEDURE — 81241 F5 GENE: CPT | Performed by: INTERNAL MEDICINE

## 2022-04-23 PROCEDURE — 85705 THROMBOPLASTIN INHIBITION: CPT | Performed by: INTERNAL MEDICINE

## 2022-04-23 PROCEDURE — 93005 ELECTROCARDIOGRAM TRACING: CPT | Performed by: INTERNAL MEDICINE

## 2022-04-23 PROCEDURE — 85730 THROMBOPLASTIN TIME PARTIAL: CPT | Performed by: INTERNAL MEDICINE

## 2022-04-23 PROCEDURE — 85613 RUSSELL VIPER VENOM DILUTED: CPT | Performed by: INTERNAL MEDICINE

## 2022-04-23 PROCEDURE — 80053 COMPREHEN METABOLIC PANEL: CPT | Performed by: INTERNAL MEDICINE

## 2022-04-23 PROCEDURE — 86146 BETA-2 GLYCOPROTEIN ANTIBODY: CPT | Performed by: INTERNAL MEDICINE

## 2022-04-23 PROCEDURE — 99222 1ST HOSP IP/OBS MODERATE 55: CPT | Performed by: INTERNAL MEDICINE

## 2022-04-23 PROCEDURE — 36415 COLL VENOUS BLD VENIPUNCTURE: CPT | Performed by: INTERNAL MEDICINE

## 2022-04-23 PROCEDURE — 93306 TTE W/DOPPLER COMPLETE: CPT | Performed by: INTERNAL MEDICINE

## 2022-04-23 PROCEDURE — 99232 SBSQ HOSP IP/OBS MODERATE 35: CPT | Performed by: INTERNAL MEDICINE

## 2022-04-23 RX ORDER — TEMAZEPAM 15 MG/1
15 CAPSULE ORAL NIGHTLY
Status: DISCONTINUED | OUTPATIENT
Start: 2022-04-23 | End: 2022-04-26 | Stop reason: HOSPADM

## 2022-04-23 RX ORDER — MELOXICAM 15 MG/1
15 TABLET ORAL DAILY
Status: DISCONTINUED | OUTPATIENT
Start: 2022-04-23 | End: 2022-04-24

## 2022-04-23 RX ORDER — GABAPENTIN 300 MG/1
300 CAPSULE ORAL 3 TIMES DAILY
Status: DISCONTINUED | OUTPATIENT
Start: 2022-04-23 | End: 2022-04-24

## 2022-04-23 RX ORDER — MELOXICAM 15 MG/1
15 TABLET ORAL DAILY
Status: DISCONTINUED | OUTPATIENT
Start: 2022-04-23 | End: 2022-04-23

## 2022-04-23 RX ORDER — HYDROCODONE BITARTRATE AND ACETAMINOPHEN 10; 325 MG/1; MG/1
1 TABLET ORAL EVERY 6 HOURS PRN
Status: DISCONTINUED | OUTPATIENT
Start: 2022-04-23 | End: 2022-04-25

## 2022-04-23 RX ORDER — FUROSEMIDE 20 MG/1
20 TABLET ORAL DAILY
Status: DISCONTINUED | OUTPATIENT
Start: 2022-04-23 | End: 2022-04-26 | Stop reason: HOSPADM

## 2022-04-23 RX ORDER — METHOCARBAMOL 750 MG/1
750 TABLET, FILM COATED ORAL EVERY 8 HOURS SCHEDULED
Status: DISCONTINUED | OUTPATIENT
Start: 2022-04-23 | End: 2022-04-26 | Stop reason: HOSPADM

## 2022-04-23 RX ORDER — NAPROXEN 250 MG/1
500 TABLET ORAL 2 TIMES DAILY WITH MEALS
Status: DISCONTINUED | OUTPATIENT
Start: 2022-04-23 | End: 2022-04-23

## 2022-04-23 RX ORDER — METOPROLOL SUCCINATE 50 MG/1
50 TABLET, EXTENDED RELEASE ORAL DAILY
Status: DISCONTINUED | OUTPATIENT
Start: 2022-04-23 | End: 2022-04-26 | Stop reason: HOSPADM

## 2022-04-23 RX ORDER — TAMSULOSIN HYDROCHLORIDE 0.4 MG/1
0.4 CAPSULE ORAL DAILY
Status: DISCONTINUED | OUTPATIENT
Start: 2022-04-23 | End: 2022-04-26 | Stop reason: HOSPADM

## 2022-04-23 RX ORDER — BUPROPION HYDROCHLORIDE 150 MG/1
300 TABLET ORAL DAILY
Status: DISCONTINUED | OUTPATIENT
Start: 2022-04-23 | End: 2022-04-26 | Stop reason: HOSPADM

## 2022-04-23 RX ORDER — ATORVASTATIN CALCIUM 20 MG/1
20 TABLET, FILM COATED ORAL NIGHTLY
Status: DISCONTINUED | OUTPATIENT
Start: 2022-04-23 | End: 2022-04-26 | Stop reason: HOSPADM

## 2022-04-23 RX ORDER — LISINOPRIL 20 MG/1
20 TABLET ORAL
Status: DISCONTINUED | OUTPATIENT
Start: 2022-04-23 | End: 2022-04-26 | Stop reason: HOSPADM

## 2022-04-23 RX ORDER — MORPHINE SULFATE 4 MG/ML
4 INJECTION, SOLUTION INTRAMUSCULAR; INTRAVENOUS EVERY 4 HOURS PRN
Status: DISCONTINUED | OUTPATIENT
Start: 2022-04-23 | End: 2022-04-23

## 2022-04-23 RX ORDER — PANTOPRAZOLE SODIUM 40 MG/1
40 TABLET, DELAYED RELEASE ORAL DAILY
Status: DISCONTINUED | OUTPATIENT
Start: 2022-04-23 | End: 2022-04-26 | Stop reason: HOSPADM

## 2022-04-23 RX ORDER — ASPIRIN 81 MG/1
81 TABLET ORAL DAILY
Status: DISCONTINUED | OUTPATIENT
Start: 2022-04-23 | End: 2022-04-26 | Stop reason: HOSPADM

## 2022-04-23 RX ADMIN — MORPHINE SULFATE 4 MG: 4 INJECTION INTRAVENOUS at 04:25

## 2022-04-23 RX ADMIN — TEMAZEPAM 15 MG: 15 CAPSULE ORAL at 21:49

## 2022-04-23 RX ADMIN — ONDANSETRON 4 MG: 2 INJECTION INTRAMUSCULAR; INTRAVENOUS at 22:05

## 2022-04-23 RX ADMIN — Medication 10 ML: at 21:56

## 2022-04-23 RX ADMIN — METHOCARBAMOL 750 MG: 750 TABLET, FILM COATED ORAL at 14:44

## 2022-04-23 RX ADMIN — HYDROCODONE BITARTRATE AND ACETAMINOPHEN 1 TABLET: 10; 325 TABLET ORAL at 16:04

## 2022-04-23 RX ADMIN — HYDROMORPHONE HYDROCHLORIDE 1 MG: 1 INJECTION, SOLUTION INTRAMUSCULAR; INTRAVENOUS; SUBCUTANEOUS at 16:40

## 2022-04-23 RX ADMIN — ASPIRIN 81 MG: 81 TABLET, COATED ORAL at 10:39

## 2022-04-23 RX ADMIN — TAMSULOSIN HYDROCHLORIDE 0.4 MG: 0.4 CAPSULE ORAL at 10:41

## 2022-04-23 RX ADMIN — MORPHINE SULFATE 4 MG: 4 INJECTION INTRAVENOUS at 12:49

## 2022-04-23 RX ADMIN — ATORVASTATIN CALCIUM 20 MG: 20 TABLET, FILM COATED ORAL at 21:50

## 2022-04-23 RX ADMIN — Medication 10 ML: at 08:46

## 2022-04-23 RX ADMIN — HYDROMORPHONE HYDROCHLORIDE 1 MG: 1 INJECTION, SOLUTION INTRAMUSCULAR; INTRAVENOUS; SUBCUTANEOUS at 21:49

## 2022-04-23 RX ADMIN — ALPRAZOLAM 0.5 MG: 0.5 TABLET ORAL at 11:19

## 2022-04-23 RX ADMIN — MORPHINE SULFATE 4 MG: 4 INJECTION INTRAVENOUS at 08:46

## 2022-04-23 RX ADMIN — GABAPENTIN 300 MG: 300 CAPSULE ORAL at 10:41

## 2022-04-23 RX ADMIN — HEPARIN SODIUM 15 UNITS/KG/HR: 10000 INJECTION, SOLUTION INTRAVENOUS at 07:32

## 2022-04-23 RX ADMIN — HYDROCODONE BITARTRATE AND ACETAMINOPHEN 1 TABLET: 7.5; 325 TABLET ORAL at 10:02

## 2022-04-23 RX ADMIN — FUROSEMIDE 20 MG: 20 TABLET ORAL at 10:41

## 2022-04-23 RX ADMIN — BUPROPION HYDROCHLORIDE 300 MG: 150 TABLET, EXTENDED RELEASE ORAL at 10:41

## 2022-04-23 RX ADMIN — PANTOPRAZOLE SODIUM 40 MG: 40 TABLET, DELAYED RELEASE ORAL at 10:39

## 2022-04-23 RX ADMIN — METHOCARBAMOL 750 MG: 750 TABLET, FILM COATED ORAL at 21:50

## 2022-04-24 ENCOUNTER — APPOINTMENT (OUTPATIENT)
Dept: CT IMAGING | Facility: HOSPITAL | Age: 67
End: 2022-04-24

## 2022-04-24 ENCOUNTER — APPOINTMENT (OUTPATIENT)
Dept: CARDIOLOGY | Facility: HOSPITAL | Age: 67
End: 2022-04-24

## 2022-04-24 LAB
APTT PPP: 37.2 SECONDS (ref 61–76.5)
BASOPHILS # BLD AUTO: 0 10*3/MM3 (ref 0–0.2)
BASOPHILS NFR BLD AUTO: 0.1 % (ref 0–1.5)
BH CV LOW VAS LEFT GASTRONEMIUS VESSEL: 1
BH CV LOWER VASCULAR LEFT COMMON FEMORAL AUGMENT: NORMAL
BH CV LOWER VASCULAR LEFT COMMON FEMORAL COMPETENT: NORMAL
BH CV LOWER VASCULAR LEFT COMMON FEMORAL COMPRESS: NORMAL
BH CV LOWER VASCULAR LEFT COMMON FEMORAL PHASIC: NORMAL
BH CV LOWER VASCULAR LEFT COMMON FEMORAL SPONT: NORMAL
BH CV LOWER VASCULAR LEFT DISTAL FEMORAL COMPRESS: NORMAL
BH CV LOWER VASCULAR LEFT GASTRONEMIUS COMPRESS: NORMAL
BH CV LOWER VASCULAR LEFT GASTRONEMIUS THROMBUS: NORMAL
BH CV LOWER VASCULAR LEFT GREATER SAPH AK COMPRESS: NORMAL
BH CV LOWER VASCULAR LEFT GREATER SAPH BK COMPRESS: NORMAL
BH CV LOWER VASCULAR LEFT LESSER SAPH COMPRESS: NORMAL
BH CV LOWER VASCULAR LEFT MID FEMORAL AUGMENT: NORMAL
BH CV LOWER VASCULAR LEFT MID FEMORAL COMPETENT: NORMAL
BH CV LOWER VASCULAR LEFT MID FEMORAL COMPRESS: NORMAL
BH CV LOWER VASCULAR LEFT MID FEMORAL PHASIC: NORMAL
BH CV LOWER VASCULAR LEFT MID FEMORAL SPONT: NORMAL
BH CV LOWER VASCULAR LEFT PERONEAL COMPRESS: NORMAL
BH CV LOWER VASCULAR LEFT POPLITEAL AUGMENT: NORMAL
BH CV LOWER VASCULAR LEFT POPLITEAL COMPETENT: NORMAL
BH CV LOWER VASCULAR LEFT POPLITEAL COMPRESS: NORMAL
BH CV LOWER VASCULAR LEFT POPLITEAL PHASIC: NORMAL
BH CV LOWER VASCULAR LEFT POPLITEAL SPONT: NORMAL
BH CV LOWER VASCULAR LEFT POSTERIOR TIBIAL COMPRESS: NORMAL
BH CV LOWER VASCULAR LEFT PROXIMAL FEMORAL COMPRESS: NORMAL
BH CV LOWER VASCULAR LEFT SAPHENOFEMORAL JUNCTION COMPRESS: NORMAL
BH CV LOWER VASCULAR RIGHT COMMON FEMORAL AUGMENT: NORMAL
BH CV LOWER VASCULAR RIGHT COMMON FEMORAL COMPETENT: NORMAL
BH CV LOWER VASCULAR RIGHT COMMON FEMORAL COMPRESS: NORMAL
BH CV LOWER VASCULAR RIGHT COMMON FEMORAL PHASIC: NORMAL
BH CV LOWER VASCULAR RIGHT COMMON FEMORAL SPONT: NORMAL
BH CV LOWER VASCULAR RIGHT DISTAL FEMORAL COMPRESS: NORMAL
BH CV LOWER VASCULAR RIGHT GASTRONEMIUS COMPRESS: NORMAL
BH CV LOWER VASCULAR RIGHT GREATER SAPH AK COMPRESS: NORMAL
BH CV LOWER VASCULAR RIGHT GREATER SAPH BK COMPRESS: NORMAL
BH CV LOWER VASCULAR RIGHT LESSER SAPH COMPRESS: NORMAL
BH CV LOWER VASCULAR RIGHT MID FEMORAL AUGMENT: NORMAL
BH CV LOWER VASCULAR RIGHT MID FEMORAL COMPETENT: NORMAL
BH CV LOWER VASCULAR RIGHT MID FEMORAL COMPRESS: NORMAL
BH CV LOWER VASCULAR RIGHT MID FEMORAL PHASIC: NORMAL
BH CV LOWER VASCULAR RIGHT MID FEMORAL SPONT: NORMAL
BH CV LOWER VASCULAR RIGHT PERONEAL COMPRESS: NORMAL
BH CV LOWER VASCULAR RIGHT POPLITEAL AUGMENT: NORMAL
BH CV LOWER VASCULAR RIGHT POPLITEAL COMPETENT: NORMAL
BH CV LOWER VASCULAR RIGHT POPLITEAL COMPRESS: NORMAL
BH CV LOWER VASCULAR RIGHT POPLITEAL PHASIC: NORMAL
BH CV LOWER VASCULAR RIGHT POPLITEAL SPONT: NORMAL
BH CV LOWER VASCULAR RIGHT POSTERIOR TIBIAL COMPRESS: NORMAL
BH CV LOWER VASCULAR RIGHT PROXIMAL FEMORAL COMPRESS: NORMAL
BH CV LOWER VASCULAR RIGHT SAPHENOFEMORAL JUNCTION COMPRESS: NORMAL
DEPRECATED RDW RBC AUTO: 47.3 FL (ref 37–54)
EOSINOPHIL # BLD AUTO: 0.1 10*3/MM3 (ref 0–0.4)
EOSINOPHIL NFR BLD AUTO: 1.7 % (ref 0.3–6.2)
ERYTHROCYTE [DISTWIDTH] IN BLOOD BY AUTOMATED COUNT: 16.2 % (ref 12.3–15.4)
HCT VFR BLD AUTO: 39.2 % (ref 37.5–51)
HCYS SERPL-MCNC: 13.4 UMOL/L (ref 0–15)
HGB BLD-MCNC: 13.1 G/DL (ref 13–17.7)
LYMPHOCYTES # BLD AUTO: 1.6 10*3/MM3 (ref 0.7–3.1)
LYMPHOCYTES NFR BLD AUTO: 19.4 % (ref 19.6–45.3)
MAXIMAL PREDICTED HEART RATE: 154 BPM
MCH RBC QN AUTO: 28 PG (ref 26.6–33)
MCHC RBC AUTO-ENTMCNC: 33.3 G/DL (ref 31.5–35.7)
MCV RBC AUTO: 84 FL (ref 79–97)
MONOCYTES # BLD AUTO: 0.8 10*3/MM3 (ref 0.1–0.9)
MONOCYTES NFR BLD AUTO: 10.4 % (ref 5–12)
NEUTROPHILS NFR BLD AUTO: 5.6 10*3/MM3 (ref 1.7–7)
NEUTROPHILS NFR BLD AUTO: 68.4 % (ref 42.7–76)
NRBC BLD AUTO-RTO: 0 /100 WBC (ref 0–0.2)
PLATELET # BLD AUTO: 134 10*3/MM3 (ref 140–450)
PMV BLD AUTO: 9.3 FL (ref 6–12)
QT INTERVAL: 347 MS
QT INTERVAL: 363 MS
RBC # BLD AUTO: 4.67 10*6/MM3 (ref 4.14–5.8)
STRESS TARGET HR: 131 BPM
TROPONIN T SERPL-MCNC: <0.01 NG/ML (ref 0–0.03)
WBC NRBC COR # BLD: 8.1 10*3/MM3 (ref 3.4–10.8)

## 2022-04-24 PROCEDURE — 94799 UNLISTED PULMONARY SVC/PX: CPT

## 2022-04-24 PROCEDURE — 93005 ELECTROCARDIOGRAM TRACING: CPT | Performed by: INTERNAL MEDICINE

## 2022-04-24 PROCEDURE — 25010000002 HEPARIN (PORCINE) 25000-0.45 UT/250ML-% SOLUTION: Performed by: INTERNAL MEDICINE

## 2022-04-24 PROCEDURE — 85025 COMPLETE CBC W/AUTO DIFF WBC: CPT | Performed by: INTERNAL MEDICINE

## 2022-04-24 PROCEDURE — 25010000002 MORPHINE PER 10 MG: Performed by: INTERNAL MEDICINE

## 2022-04-24 PROCEDURE — 0 IOPAMIDOL PER 1 ML: Performed by: INTERNAL MEDICINE

## 2022-04-24 PROCEDURE — 99223 1ST HOSP IP/OBS HIGH 75: CPT | Performed by: INTERNAL MEDICINE

## 2022-04-24 PROCEDURE — 99233 SBSQ HOSP IP/OBS HIGH 50: CPT | Performed by: INTERNAL MEDICINE

## 2022-04-24 PROCEDURE — 84484 ASSAY OF TROPONIN QUANT: CPT | Performed by: INTERNAL MEDICINE

## 2022-04-24 PROCEDURE — 99232 SBSQ HOSP IP/OBS MODERATE 35: CPT | Performed by: INTERNAL MEDICINE

## 2022-04-24 PROCEDURE — 85730 THROMBOPLASTIN TIME PARTIAL: CPT | Performed by: INTERNAL MEDICINE

## 2022-04-24 PROCEDURE — 93970 EXTREMITY STUDY: CPT

## 2022-04-24 PROCEDURE — 25010000002 ONDANSETRON PER 1 MG: Performed by: INTERNAL MEDICINE

## 2022-04-24 PROCEDURE — 74177 CT ABD & PELVIS W/CONTRAST: CPT

## 2022-04-24 PROCEDURE — 25010000002 HYDROMORPHONE 1 MG/ML SOLUTION: Performed by: INTERNAL MEDICINE

## 2022-04-24 PROCEDURE — 25010000002 ENOXAPARIN PER 10 MG: Performed by: INTERNAL MEDICINE

## 2022-04-24 RX ORDER — TRAMADOL HYDROCHLORIDE 50 MG/1
50 TABLET ORAL ONCE AS NEEDED
Status: COMPLETED | OUTPATIENT
Start: 2022-04-24 | End: 2022-04-24

## 2022-04-24 RX ORDER — MORPHINE SULFATE 2 MG/ML
2 INJECTION, SOLUTION INTRAMUSCULAR; INTRAVENOUS EVERY 4 HOURS PRN
Status: DISCONTINUED | OUTPATIENT
Start: 2022-04-24 | End: 2022-04-24

## 2022-04-24 RX ADMIN — Medication 10 ML: at 20:16

## 2022-04-24 RX ADMIN — HYDROMORPHONE HYDROCHLORIDE 0.5 MG: 1 INJECTION, SOLUTION INTRAMUSCULAR; INTRAVENOUS; SUBCUTANEOUS at 18:29

## 2022-04-24 RX ADMIN — FUROSEMIDE 20 MG: 20 TABLET ORAL at 08:46

## 2022-04-24 RX ADMIN — Medication 10 ML: at 08:48

## 2022-04-24 RX ADMIN — TAMSULOSIN HYDROCHLORIDE 0.4 MG: 0.4 CAPSULE ORAL at 08:45

## 2022-04-24 RX ADMIN — TRAMADOL HYDROCHLORIDE 50 MG: 50 TABLET, COATED ORAL at 20:15

## 2022-04-24 RX ADMIN — IOPAMIDOL 100 ML: 755 INJECTION, SOLUTION INTRAVENOUS at 11:04

## 2022-04-24 RX ADMIN — ALPRAZOLAM 0.5 MG: 0.5 TABLET ORAL at 08:46

## 2022-04-24 RX ADMIN — ASPIRIN 81 MG: 81 TABLET, COATED ORAL at 08:46

## 2022-04-24 RX ADMIN — METHOCARBAMOL 750 MG: 750 TABLET, FILM COATED ORAL at 14:28

## 2022-04-24 RX ADMIN — PANTOPRAZOLE SODIUM 40 MG: 40 TABLET, DELAYED RELEASE ORAL at 08:45

## 2022-04-24 RX ADMIN — LISINOPRIL 20 MG: 20 TABLET ORAL at 08:48

## 2022-04-24 RX ADMIN — ONDANSETRON 4 MG: 2 INJECTION INTRAMUSCULAR; INTRAVENOUS at 22:39

## 2022-04-24 RX ADMIN — HYDROMORPHONE HYDROCHLORIDE 0.5 MG: 1 INJECTION, SOLUTION INTRAMUSCULAR; INTRAVENOUS; SUBCUTANEOUS at 08:15

## 2022-04-24 RX ADMIN — HEPARIN SODIUM 15 UNITS/KG/HR: 10000 INJECTION, SOLUTION INTRAVENOUS at 00:27

## 2022-04-24 RX ADMIN — TEMAZEPAM 15 MG: 15 CAPSULE ORAL at 20:15

## 2022-04-24 RX ADMIN — HYDROCODONE BITARTRATE AND ACETAMINOPHEN 1 TABLET: 10; 325 TABLET ORAL at 11:29

## 2022-04-24 RX ADMIN — HYDROCODONE BITARTRATE AND ACETAMINOPHEN 1 TABLET: 10; 325 TABLET ORAL at 04:56

## 2022-04-24 RX ADMIN — METOPROLOL SUCCINATE 50 MG: 50 TABLET, EXTENDED RELEASE ORAL at 08:45

## 2022-04-24 RX ADMIN — HYDROMORPHONE HYDROCHLORIDE 0.5 MG: 1 INJECTION, SOLUTION INTRAMUSCULAR; INTRAVENOUS; SUBCUTANEOUS at 14:28

## 2022-04-24 RX ADMIN — HYDROCODONE BITARTRATE AND ACETAMINOPHEN 1 TABLET: 10; 325 TABLET ORAL at 17:29

## 2022-04-24 RX ADMIN — ENOXAPARIN SODIUM 110 MG: 150 INJECTION SUBCUTANEOUS at 20:15

## 2022-04-24 RX ADMIN — METHOCARBAMOL 750 MG: 750 TABLET, FILM COATED ORAL at 08:45

## 2022-04-24 RX ADMIN — ALPRAZOLAM 0.5 MG: 0.5 TABLET ORAL at 20:15

## 2022-04-24 RX ADMIN — ONDANSETRON 4 MG: 2 INJECTION INTRAMUSCULAR; INTRAVENOUS at 08:48

## 2022-04-24 RX ADMIN — METHOCARBAMOL 750 MG: 750 TABLET, FILM COATED ORAL at 22:32

## 2022-04-24 RX ADMIN — ENOXAPARIN SODIUM 110 MG: 150 INJECTION SUBCUTANEOUS at 11:31

## 2022-04-24 RX ADMIN — ATORVASTATIN CALCIUM 20 MG: 20 TABLET, FILM COATED ORAL at 20:15

## 2022-04-24 RX ADMIN — HYDROMORPHONE HYDROCHLORIDE 0.5 MG: 1 INJECTION, SOLUTION INTRAMUSCULAR; INTRAVENOUS; SUBCUTANEOUS at 22:32

## 2022-04-24 RX ADMIN — MORPHINE SULFATE 2 MG: 2 INJECTION, SOLUTION INTRAMUSCULAR; INTRAVENOUS at 12:16

## 2022-04-24 RX ADMIN — BUPROPION HYDROCHLORIDE 300 MG: 150 TABLET, EXTENDED RELEASE ORAL at 08:46

## 2022-04-25 LAB
ANA SER QL: NEGATIVE
ANION GAP SERPL CALCULATED.3IONS-SCNC: 11 MMOL/L (ref 5–15)
AT III PPP CHRO-ACNC: 99 % (ref 75–120)
BUN SERPL-MCNC: 15 MG/DL (ref 8–23)
BUN/CREAT SERPL: 12.1 (ref 7–25)
CALCIUM SPEC-SCNC: 9 MG/DL (ref 8.6–10.5)
CHLORIDE SERPL-SCNC: 100 MMOL/L (ref 98–107)
CO2 SERPL-SCNC: 20 MMOL/L (ref 22–29)
CREAT SERPL-MCNC: 1.24 MG/DL (ref 0.76–1.27)
DEPRECATED RDW RBC AUTO: 47.3 FL (ref 37–54)
EGFRCR SERPLBLD CKD-EPI 2021: 64.1 ML/MIN/1.73
ERYTHROCYTE [DISTWIDTH] IN BLOOD BY AUTOMATED COUNT: 15.9 % (ref 12.3–15.4)
GLUCOSE SERPL-MCNC: 129 MG/DL (ref 65–99)
HCT VFR BLD AUTO: 39 % (ref 37.5–51)
HGB BLD-MCNC: 12.8 G/DL (ref 13–17.7)
MCH RBC QN AUTO: 27.8 PG (ref 26.6–33)
MCHC RBC AUTO-ENTMCNC: 32.9 G/DL (ref 31.5–35.7)
MCV RBC AUTO: 84.5 FL (ref 79–97)
PLATELET # BLD AUTO: 161 10*3/MM3 (ref 140–450)
PMV BLD AUTO: 8.8 FL (ref 6–12)
POTASSIUM SERPL-SCNC: 4.4 MMOL/L (ref 3.5–5.2)
PROT C ACT/NOR PPP: 99 % (ref 70–130)
RBC # BLD AUTO: 4.61 10*6/MM3 (ref 4.14–5.8)
SODIUM SERPL-SCNC: 131 MMOL/L (ref 136–145)
WBC NRBC COR # BLD: 9.2 10*3/MM3 (ref 3.4–10.8)

## 2022-04-25 PROCEDURE — 80048 BASIC METABOLIC PNL TOTAL CA: CPT | Performed by: STUDENT IN AN ORGANIZED HEALTH CARE EDUCATION/TRAINING PROGRAM

## 2022-04-25 PROCEDURE — 99232 SBSQ HOSP IP/OBS MODERATE 35: CPT | Performed by: STUDENT IN AN ORGANIZED HEALTH CARE EDUCATION/TRAINING PROGRAM

## 2022-04-25 PROCEDURE — 25010000002 MORPHINE PER 10 MG: Performed by: STUDENT IN AN ORGANIZED HEALTH CARE EDUCATION/TRAINING PROGRAM

## 2022-04-25 PROCEDURE — 85027 COMPLETE CBC AUTOMATED: CPT | Performed by: STUDENT IN AN ORGANIZED HEALTH CARE EDUCATION/TRAINING PROGRAM

## 2022-04-25 PROCEDURE — 99233 SBSQ HOSP IP/OBS HIGH 50: CPT | Performed by: INTERNAL MEDICINE

## 2022-04-25 PROCEDURE — 25010000002 HYDROMORPHONE 1 MG/ML SOLUTION: Performed by: INTERNAL MEDICINE

## 2022-04-25 PROCEDURE — 25010000002 ENOXAPARIN PER 10 MG: Performed by: INTERNAL MEDICINE

## 2022-04-25 PROCEDURE — 63710000001 ONDANSETRON PER 8 MG: Performed by: INTERNAL MEDICINE

## 2022-04-25 RX ORDER — POLYETHYLENE GLYCOL 3350 17 G/17G
17 POWDER, FOR SOLUTION ORAL DAILY
Status: DISCONTINUED | OUTPATIENT
Start: 2022-04-25 | End: 2022-04-26 | Stop reason: HOSPADM

## 2022-04-25 RX ORDER — GABAPENTIN 300 MG/1
300 CAPSULE ORAL 3 TIMES DAILY
Status: DISCONTINUED | OUTPATIENT
Start: 2022-04-25 | End: 2022-04-25

## 2022-04-25 RX ORDER — DOCUSATE SODIUM 100 MG/1
100 CAPSULE, LIQUID FILLED ORAL 2 TIMES DAILY
Status: DISCONTINUED | OUTPATIENT
Start: 2022-04-25 | End: 2022-04-26 | Stop reason: HOSPADM

## 2022-04-25 RX ORDER — GABAPENTIN 300 MG/1
600 CAPSULE ORAL 3 TIMES DAILY
Status: DISCONTINUED | OUTPATIENT
Start: 2022-04-25 | End: 2022-04-26 | Stop reason: HOSPADM

## 2022-04-25 RX ORDER — MORPHINE SULFATE 2 MG/ML
2 INJECTION, SOLUTION INTRAMUSCULAR; INTRAVENOUS EVERY 4 HOURS PRN
Status: DISCONTINUED | OUTPATIENT
Start: 2022-04-25 | End: 2022-04-25

## 2022-04-25 RX ORDER — OXYCODONE HYDROCHLORIDE 5 MG/1
5 TABLET ORAL EVERY 4 HOURS PRN
Status: DISCONTINUED | OUTPATIENT
Start: 2022-04-25 | End: 2022-04-26 | Stop reason: HOSPADM

## 2022-04-25 RX ORDER — HYDROCODONE BITARTRATE AND ACETAMINOPHEN 10; 325 MG/1; MG/1
1 TABLET ORAL EVERY 4 HOURS
Status: DISCONTINUED | OUTPATIENT
Start: 2022-04-25 | End: 2022-04-26 | Stop reason: HOSPADM

## 2022-04-25 RX ORDER — MORPHINE SULFATE 2 MG/ML
2 INJECTION, SOLUTION INTRAMUSCULAR; INTRAVENOUS EVERY 6 HOURS PRN
Status: DISCONTINUED | OUTPATIENT
Start: 2022-04-25 | End: 2022-04-26 | Stop reason: HOSPADM

## 2022-04-25 RX ADMIN — ATORVASTATIN CALCIUM 20 MG: 20 TABLET, FILM COATED ORAL at 21:40

## 2022-04-25 RX ADMIN — PANTOPRAZOLE SODIUM 40 MG: 40 TABLET, DELAYED RELEASE ORAL at 09:46

## 2022-04-25 RX ADMIN — HYDROCODONE BITARTRATE AND ACETAMINOPHEN 1 TABLET: 10; 325 TABLET ORAL at 21:40

## 2022-04-25 RX ADMIN — HYDROCODONE BITARTRATE AND ACETAMINOPHEN 1 TABLET: 10; 325 TABLET ORAL at 18:17

## 2022-04-25 RX ADMIN — GABAPENTIN 300 MG: 300 CAPSULE ORAL at 09:46

## 2022-04-25 RX ADMIN — FUROSEMIDE 20 MG: 20 TABLET ORAL at 09:46

## 2022-04-25 RX ADMIN — METHOCARBAMOL 750 MG: 750 TABLET, FILM COATED ORAL at 05:22

## 2022-04-25 RX ADMIN — HYDROMORPHONE HYDROCHLORIDE 0.5 MG: 1 INJECTION, SOLUTION INTRAMUSCULAR; INTRAVENOUS; SUBCUTANEOUS at 07:56

## 2022-04-25 RX ADMIN — Medication 10 ML: at 10:36

## 2022-04-25 RX ADMIN — HYDROCODONE BITARTRATE AND ACETAMINOPHEN 1 TABLET: 10; 325 TABLET ORAL at 13:48

## 2022-04-25 RX ADMIN — ENOXAPARIN SODIUM 110 MG: 150 INJECTION SUBCUTANEOUS at 21:40

## 2022-04-25 RX ADMIN — MORPHINE SULFATE 2 MG: 2 INJECTION, SOLUTION INTRAMUSCULAR; INTRAVENOUS at 15:45

## 2022-04-25 RX ADMIN — METHOCARBAMOL 750 MG: 750 TABLET, FILM COATED ORAL at 21:41

## 2022-04-25 RX ADMIN — ENOXAPARIN SODIUM 110 MG: 150 INJECTION SUBCUTANEOUS at 09:46

## 2022-04-25 RX ADMIN — ALPRAZOLAM 0.5 MG: 0.5 TABLET ORAL at 23:25

## 2022-04-25 RX ADMIN — BUPROPION HYDROCHLORIDE 300 MG: 150 TABLET, EXTENDED RELEASE ORAL at 09:46

## 2022-04-25 RX ADMIN — TAMSULOSIN HYDROCHLORIDE 0.4 MG: 0.4 CAPSULE ORAL at 09:46

## 2022-04-25 RX ADMIN — TEMAZEPAM 15 MG: 15 CAPSULE ORAL at 21:40

## 2022-04-25 RX ADMIN — GABAPENTIN 600 MG: 300 CAPSULE ORAL at 21:40

## 2022-04-25 RX ADMIN — Medication 10 ML: at 21:40

## 2022-04-25 RX ADMIN — DOCUSATE SODIUM 100 MG: 100 CAPSULE, LIQUID FILLED ORAL at 21:40

## 2022-04-25 RX ADMIN — ALPRAZOLAM 0.5 MG: 0.5 TABLET ORAL at 10:25

## 2022-04-25 RX ADMIN — ASPIRIN 81 MG: 81 TABLET, COATED ORAL at 09:46

## 2022-04-25 RX ADMIN — METOPROLOL SUCCINATE 50 MG: 50 TABLET, EXTENDED RELEASE ORAL at 09:46

## 2022-04-25 RX ADMIN — HYDROCODONE BITARTRATE AND ACETAMINOPHEN 1 TABLET: 10; 325 TABLET ORAL at 05:22

## 2022-04-25 RX ADMIN — METHOCARBAMOL 750 MG: 750 TABLET, FILM COATED ORAL at 13:48

## 2022-04-25 RX ADMIN — ONDANSETRON HYDROCHLORIDE 4 MG: 4 TABLET, FILM COATED ORAL at 15:51

## 2022-04-25 RX ADMIN — OXYCODONE 5 MG: 5 TABLET ORAL at 17:34

## 2022-04-25 RX ADMIN — POLYETHYLENE GLYCOL 3350 17 G: 17 POWDER, FOR SOLUTION ORAL at 18:17

## 2022-04-25 RX ADMIN — MORPHINE SULFATE 2 MG: 2 INJECTION, SOLUTION INTRAMUSCULAR; INTRAVENOUS at 11:35

## 2022-04-25 RX ADMIN — ONDANSETRON HYDROCHLORIDE 4 MG: 4 TABLET, FILM COATED ORAL at 07:56

## 2022-04-25 RX ADMIN — LISINOPRIL 20 MG: 20 TABLET ORAL at 09:46

## 2022-04-26 VITALS
SYSTOLIC BLOOD PRESSURE: 121 MMHG | TEMPERATURE: 98.7 F | WEIGHT: 231.3 LBS | RESPIRATION RATE: 20 BRPM | OXYGEN SATURATION: 95 % | HEART RATE: 62 BPM | BODY MASS INDEX: 35.06 KG/M2 | DIASTOLIC BLOOD PRESSURE: 64 MMHG | HEIGHT: 68 IN

## 2022-04-26 LAB
ANION GAP SERPL CALCULATED.3IONS-SCNC: 14 MMOL/L (ref 5–15)
B2 GLYCOPROT1 IGA SER-ACNC: <9 GPI IGA UNITS (ref 0–25)
B2 GLYCOPROT1 IGG SER-ACNC: <9 GPI IGG UNITS (ref 0–20)
B2 GLYCOPROT1 IGM SER-ACNC: <9 GPI IGM UNITS (ref 0–32)
BUN SERPL-MCNC: 13 MG/DL (ref 8–23)
BUN/CREAT SERPL: 11.8 (ref 7–25)
CALCIUM SPEC-SCNC: 8.3 MG/DL (ref 8.6–10.5)
CARDIOLIPIN IGG SER IA-ACNC: <9 GPL U/ML (ref 0–14)
CARDIOLIPIN IGM SER IA-ACNC: 12 MPL U/ML (ref 0–12)
CHLORIDE SERPL-SCNC: 100 MMOL/L (ref 98–107)
CO2 SERPL-SCNC: 23 MMOL/L (ref 22–29)
CREAT SERPL-MCNC: 1.1 MG/DL (ref 0.76–1.27)
DEPRECATED RDW RBC AUTO: 45.5 FL (ref 37–54)
EGFRCR SERPLBLD CKD-EPI 2021: 74 ML/MIN/1.73
ERYTHROCYTE [DISTWIDTH] IN BLOOD BY AUTOMATED COUNT: 15.4 % (ref 12.3–15.4)
FACT V ACT/NOR PPP: 127 % (ref 70–150)
GLUCOSE SERPL-MCNC: 99 MG/DL (ref 65–99)
HCT VFR BLD AUTO: 36.6 % (ref 37.5–51)
HGB BLD-MCNC: 12 G/DL (ref 13–17.7)
MCH RBC QN AUTO: 27.6 PG (ref 26.6–33)
MCHC RBC AUTO-ENTMCNC: 32.8 G/DL (ref 31.5–35.7)
MCV RBC AUTO: 84.2 FL (ref 79–97)
PLATELET # BLD AUTO: 158 10*3/MM3 (ref 140–450)
PMV BLD AUTO: 9.1 FL (ref 6–12)
POTASSIUM SERPL-SCNC: 3.9 MMOL/L (ref 3.5–5.2)
PROT S ACT/NOR PPP: 72 % (ref 63–140)
PROT S AG ACT/NOR PPP IA: 132 % (ref 60–150)
RBC # BLD AUTO: 4.35 10*6/MM3 (ref 4.14–5.8)
SODIUM SERPL-SCNC: 137 MMOL/L (ref 136–145)
WBC NRBC COR # BLD: 7.1 10*3/MM3 (ref 3.4–10.8)

## 2022-04-26 PROCEDURE — 94799 UNLISTED PULMONARY SVC/PX: CPT

## 2022-04-26 PROCEDURE — 99239 HOSP IP/OBS DSCHRG MGMT >30: CPT | Performed by: STUDENT IN AN ORGANIZED HEALTH CARE EDUCATION/TRAINING PROGRAM

## 2022-04-26 PROCEDURE — 80048 BASIC METABOLIC PNL TOTAL CA: CPT | Performed by: STUDENT IN AN ORGANIZED HEALTH CARE EDUCATION/TRAINING PROGRAM

## 2022-04-26 PROCEDURE — 85027 COMPLETE CBC AUTOMATED: CPT | Performed by: STUDENT IN AN ORGANIZED HEALTH CARE EDUCATION/TRAINING PROGRAM

## 2022-04-26 PROCEDURE — 94618 PULMONARY STRESS TESTING: CPT

## 2022-04-26 PROCEDURE — 94761 N-INVAS EAR/PLS OXIMETRY MLT: CPT

## 2022-04-26 RX ORDER — DOCUSATE SODIUM 100 MG/1
100 CAPSULE, LIQUID FILLED ORAL 2 TIMES DAILY
Qty: 60 CAPSULE | Refills: 0 | Status: SHIPPED | OUTPATIENT
Start: 2022-04-26

## 2022-04-26 RX ORDER — GABAPENTIN 600 MG/1
600 TABLET ORAL 3 TIMES DAILY
Qty: 30 TABLET | Refills: 0 | Status: SHIPPED | OUTPATIENT
Start: 2022-04-26

## 2022-04-26 RX ORDER — OXYCODONE HYDROCHLORIDE 5 MG/1
5 TABLET ORAL EVERY 8 HOURS PRN
Qty: 9 TABLET | Refills: 0 | Status: SHIPPED | OUTPATIENT
Start: 2022-04-26

## 2022-04-26 RX ORDER — HYDROCODONE BITARTRATE AND ACETAMINOPHEN 10; 325 MG/1; MG/1
1 TABLET ORAL EVERY 6 HOURS PRN
Qty: 16 TABLET | Refills: 0 | Status: SHIPPED | OUTPATIENT
Start: 2022-04-26

## 2022-04-26 RX ORDER — POLYETHYLENE GLYCOL 3350 17 G/17G
17 POWDER, FOR SOLUTION ORAL DAILY
Qty: 510 G | Refills: 0 | Status: SHIPPED | OUTPATIENT
Start: 2022-04-27

## 2022-04-26 RX ADMIN — TAMSULOSIN HYDROCHLORIDE 0.4 MG: 0.4 CAPSULE ORAL at 09:02

## 2022-04-26 RX ADMIN — DOCUSATE SODIUM 100 MG: 100 CAPSULE, LIQUID FILLED ORAL at 09:02

## 2022-04-26 RX ADMIN — GABAPENTIN 600 MG: 300 CAPSULE ORAL at 09:02

## 2022-04-26 RX ADMIN — OXYCODONE 5 MG: 5 TABLET ORAL at 12:29

## 2022-04-26 RX ADMIN — METOPROLOL SUCCINATE 50 MG: 50 TABLET, EXTENDED RELEASE ORAL at 09:02

## 2022-04-26 RX ADMIN — BUPROPION HYDROCHLORIDE 300 MG: 150 TABLET, EXTENDED RELEASE ORAL at 09:02

## 2022-04-26 RX ADMIN — FUROSEMIDE 20 MG: 20 TABLET ORAL at 09:02

## 2022-04-26 RX ADMIN — HYDROCODONE BITARTRATE AND ACETAMINOPHEN 1 TABLET: 10; 325 TABLET ORAL at 06:10

## 2022-04-26 RX ADMIN — HYDROCODONE BITARTRATE AND ACETAMINOPHEN 1 TABLET: 10; 325 TABLET ORAL at 09:02

## 2022-04-26 RX ADMIN — Medication 10 ML: at 09:05

## 2022-04-26 RX ADMIN — LISINOPRIL 20 MG: 20 TABLET ORAL at 09:02

## 2022-04-26 RX ADMIN — HYDROCODONE BITARTRATE AND ACETAMINOPHEN 1 TABLET: 10; 325 TABLET ORAL at 02:04

## 2022-04-26 RX ADMIN — ASPIRIN 81 MG: 81 TABLET, COATED ORAL at 09:02

## 2022-04-26 RX ADMIN — APIXABAN 10 MG: 5 TABLET, FILM COATED ORAL at 09:02

## 2022-04-26 RX ADMIN — PANTOPRAZOLE SODIUM 40 MG: 40 TABLET, DELAYED RELEASE ORAL at 09:02

## 2022-04-26 RX ADMIN — METHOCARBAMOL 750 MG: 750 TABLET, FILM COATED ORAL at 06:10

## 2022-04-27 ENCOUNTER — READMISSION MANAGEMENT (OUTPATIENT)
Dept: CALL CENTER | Facility: HOSPITAL | Age: 67
End: 2022-04-27

## 2022-04-27 LAB
APTT SCREEN TO CONFIRM RATIO: 1.11 RATIO (ref 0–1.34)
BACTERIA SPEC AEROBE CULT: NORMAL
BACTERIA SPEC AEROBE CULT: NORMAL
CONFIRM APTT/NORMAL: 41.2 SEC (ref 0–47.6)
LA 2 SCREEN W REFLEX-IMP: ABNORMAL
SCREEN APTT: 34.9 SEC (ref 0–51.9)
SCREEN DRVVT: 42.5 SEC (ref 0–47)
THROMBIN TIME: 31 SEC (ref 0–23)
TT IMM NP PPP: 23 SEC (ref 0–23)
TT P HPASE PPP: 16.3 SEC (ref 0–23)

## 2022-04-27 NOTE — OUTREACH NOTE
Prep Survey    Flowsheet Row Responses   [facility] patient discharged from? [facility]   Is LACE score < 7 ? No   Emergency Room discharge w/ pulse ox? No   Eligibility Readm Mgmt   Discharge diagnosis Multiple subsegmental pulmonary emboli without acute cor pulmonale   Does the patient have one of the following disease processes/diagnoses(primary or secondary)? Other   Does the patient have Home health ordered? No   Is there a DME ordered? No   Prep survey completed? Yes          JEYSON GILL - Registered Nurse

## 2022-04-28 ENCOUNTER — TELEPHONE (OUTPATIENT)
Dept: ONCOLOGY | Facility: CLINIC | Age: 67
End: 2022-04-28

## 2022-04-28 LAB — F5 P.R506Q BLD/T QL: ABNORMAL

## 2022-04-29 ENCOUNTER — TELEPHONE (OUTPATIENT)
Dept: ONCOLOGY | Facility: CLINIC | Age: 67
End: 2022-04-29

## 2022-04-29 LAB
PS IGA SER-ACNC: <1 APS UNITS (ref 0–19)
PS IGG SER-ACNC: <9 UNITS (ref 0–30)
PS IGM SER-ACNC: 16 UNITS (ref 0–30)

## 2022-04-29 NOTE — TELEPHONE ENCOUNTER
Caller: ASTRID KERNS     Relationship to patient: Child    Best call back number: [phone] CAN CALL ANYTIME AND MAY LEAVE  IF NEEDED.    Chief complaint: RESCHEDULE HOSPITAL FOLLOW UP.    Type of visit: LAB/HOSPITAL FOLLOW UP.    Requested date: ANY DAY PRIOR TO 05/22/2022 DUE TO PATIENT IS LEAVING FOR [place] ON 05/22/2022.     If rescheduling, when is the original appointment: 05/25/2022.     Additional notes:ONCE A DETERMINATION IS MADE PLEASE CONTACT PATIENTS CHILD ASTRID KERNS OR PATIENT ASAP TO UPDATE ON APPT DATE/TIME.        [facility]

## 2022-04-29 NOTE — TELEPHONE ENCOUNTER
[facility] is instructed to read the documentation below to patient  ATTEMPTED TO CONTACT PATIENT'S DAUGHTER, ASTRID, REGARDING HOSPITAL F/U APPT.  NO ANSWER.  LMV INFORMING ASTRID THAT THE APPT WAS RESCHEDULED FOR 5-17-22 AT 9:00AM.  INSTRUCTED HER TO CALL BACK IF THIS DATE AND TIME DID NOT WORK.   No lesions; no rash

## 2022-05-02 ENCOUNTER — READMISSION MANAGEMENT (OUTPATIENT)
Dept: CALL CENTER | Facility: HOSPITAL | Age: 67
End: 2022-05-02

## 2022-05-02 NOTE — OUTREACH NOTE
Medical Week 1 Survey    Flowsheet Row Responses   [facility] patient discharged from? [facility]   Does the patient have one of the following disease processes/diagnoses(primary or secondary)? Other   Week 1 attempt successful? Yes   Call start time 1308   Call end time 1314   Meds reviewed with patient/caregiver? Yes   Is the patient having any side effects they believe may be caused by any medication additions or changes? No   Is the patient taking all medications as directed (includes completed medication regime)? Yes   Medication comments taking meds as prescribed   Does the patient have a primary care provider?  Yes   Comments appointments are scheduled   Has home health visited the patient within 72 hours of discharge? N/A   Did the patient receive a copy of their discharge instructions? Yes   Nursing interventions Reviewed instructions with patient   What is the patient's perception of their health status since discharge? Improving   Is the patient/caregiver able to teach back signs and symptoms related to disease process for when to call PCP? Yes   Is the patient/caregiver able to teach back signs and symptoms related to disease process for when to call 911? Yes   Is the patient/caregiver able to teach back the hierarchy of who to call/visit for symptoms/problems? PCP, Specialist, Home health nurse, Urgent Care, ED, 911 Yes   If the patient is a current smoker, are they able to teach back resources for cessation? Not a smoker   Week 1 call completed? Yes   Wrap up additional comments offered no issues. will be going on lline for cupons for Eliquis. Also [facility]           AKSHAT PINEDA - Registered Nurse

## 2022-05-11 ENCOUNTER — READMISSION MANAGEMENT (OUTPATIENT)
Dept: CALL CENTER | Facility: HOSPITAL | Age: 67
End: 2022-05-11

## 2022-05-11 NOTE — OUTREACH NOTE
Medical Week 2 Survey    Flowsheet Row Responses   [facility] facility patient discharged from? [facility]   Does the patient have one of the following disease processes/diagnoses(primary or secondary)? Other   Week 2 attempt successful? No   Unsuccessful attempts Attempt 1          LIZBETH HOGAN - Licensed Nurse

## 2022-05-16 ENCOUNTER — OFFICE (OUTPATIENT)
Dept: URBAN - METROPOLITAN AREA CLINIC 64 | Facility: CLINIC | Age: 67
End: 2022-05-16

## 2022-05-16 VITALS
HEIGHT: 68 IN | DIASTOLIC BLOOD PRESSURE: 56 MMHG | HEART RATE: 78 BPM | WEIGHT: 219 LBS | SYSTOLIC BLOOD PRESSURE: 130 MMHG

## 2022-05-16 DIAGNOSIS — R10.84 GENERALIZED ABDOMINAL PAIN: ICD-10-CM

## 2022-05-16 PROCEDURE — 99204 OFFICE O/P NEW MOD 45 MIN: CPT | Performed by: INTERNAL MEDICINE

## 2022-05-16 RX ORDER — POLYETHYLENE GLYCOL 3350 17 G/17G
POWDER, FOR SOLUTION ORAL
Qty: 525 | Refills: 3 | Status: COMPLETED
Start: 2022-05-16 | End: 2022-08-02

## 2022-05-17 ENCOUNTER — READMISSION MANAGEMENT (OUTPATIENT)
Dept: CALL CENTER | Facility: HOSPITAL | Age: 67
End: 2022-05-17

## 2022-05-17 ENCOUNTER — OFFICE VISIT (OUTPATIENT)
Dept: ONCOLOGY | Facility: CLINIC | Age: 67
End: 2022-05-17

## 2022-05-17 ENCOUNTER — LAB (OUTPATIENT)
Dept: LAB | Facility: HOSPITAL | Age: 67
End: 2022-05-17

## 2022-05-17 VITALS
HEART RATE: 49 BPM | BODY MASS INDEX: 33.68 KG/M2 | WEIGHT: 222.2 LBS | HEIGHT: 68 IN | OXYGEN SATURATION: 97 % | DIASTOLIC BLOOD PRESSURE: 68 MMHG | TEMPERATURE: 96.8 F | SYSTOLIC BLOOD PRESSURE: 117 MMHG | RESPIRATION RATE: 18 BRPM

## 2022-05-17 DIAGNOSIS — I26.94 MULTIPLE SUBSEGMENTAL PULMONARY EMBOLI WITHOUT ACUTE COR PULMONALE: Primary | ICD-10-CM

## 2022-05-17 DIAGNOSIS — I26.94 MULTIPLE SUBSEGMENTAL PULMONARY EMBOLI WITHOUT ACUTE COR PULMONALE: ICD-10-CM

## 2022-05-17 LAB
BASOPHILS # BLD AUTO: 0.01 10*3/MM3 (ref 0–0.2)
BASOPHILS NFR BLD AUTO: 0.1 % (ref 0–1.5)
DEPRECATED RDW RBC AUTO: 47.5 FL (ref 37–54)
EOSINOPHIL # BLD AUTO: 0.18 10*3/MM3 (ref 0–0.4)
EOSINOPHIL NFR BLD AUTO: 2.5 % (ref 0.3–6.2)
ERYTHROCYTE [DISTWIDTH] IN BLOOD BY AUTOMATED COUNT: 15.1 % (ref 12.3–15.4)
HCT VFR BLD AUTO: 45.7 % (ref 37.5–51)
HGB BLD-MCNC: 14.9 G/DL (ref 13–17.7)
LYMPHOCYTES # BLD AUTO: 2.29 10*3/MM3 (ref 0.7–3.1)
LYMPHOCYTES NFR BLD AUTO: 31.6 % (ref 19.6–45.3)
MCH RBC QN AUTO: 28.4 PG (ref 26.6–33)
MCHC RBC AUTO-ENTMCNC: 32.6 G/DL (ref 31.5–35.7)
MCV RBC AUTO: 87.2 FL (ref 79–97)
MONOCYTES # BLD AUTO: 0.69 10*3/MM3 (ref 0.1–0.9)
MONOCYTES NFR BLD AUTO: 9.5 % (ref 5–12)
NEUTROPHILS NFR BLD AUTO: 4.07 10*3/MM3 (ref 1.7–7)
NEUTROPHILS NFR BLD AUTO: 56.3 % (ref 42.7–76)
PLATELET # BLD AUTO: 152 10*3/MM3 (ref 140–450)
PMV BLD AUTO: 11.5 FL (ref 6–12)
RBC # BLD AUTO: 5.24 10*6/MM3 (ref 4.14–5.8)
WBC NRBC COR # BLD: 7.24 10*3/MM3 (ref 3.4–10.8)

## 2022-05-17 PROCEDURE — 99215 OFFICE O/P EST HI 40 MIN: CPT | Performed by: INTERNAL MEDICINE

## 2022-05-17 PROCEDURE — 85025 COMPLETE CBC W/AUTO DIFF WBC: CPT

## 2022-05-17 NOTE — OUTREACH NOTE
Medical Week 2 Survey    Flowsheet Row Responses   [facility] facility patient discharged from? [facility]   Does the patient have one of the following disease processes/diagnoses(primary or secondary)? Other   Week 2 attempt successful? No   Unsuccessful attempts Attempt 2          HOLLY Dixon Registered Nurse

## 2022-05-17 NOTE — PROGRESS NOTES
Hematology/Oncology Follow-up    PATIENT NAME: Manuel Rolle  : 1955  MRN: [id number]    CHIEF COMPLAINT: Upper back pain    HISTORY OF PRESENT ILLNESS:      Manuel Rolle is a 66 y.o. male who presented to [facility] on 2022 with complaints of upper back pain to the emergency room.  Patient just had back surgery on  to remove a synovial cyst by Dr. Corcoran.  He had a D-dimer done in the emergency room which was elevated to 8.81.  He subsequently had CT scan of the chest which basically revealed extensive bilateral pulmonary arterial emboli there was no pathologically enlarged lymph nodes.  No other abnormalities were detected.  He was then placed on IV heparin and I have been consulted for his pulmonary embolism.     22  Hematology/Oncology was consulted pulmonary embolism.  It has no prior history of blood clots and there is no family history.  He does report multiple surgeries recently the major 1 being the back surgery.  He also had the cystic lesion removed, cataract surgery.  Patient was on a recent road trip to [place].     There is family history of colon cancer and uterine cancer.     His last colonoscopy was approximately 4 years ago.  According to patient and his daughter, his prep was not optimal.  He has had left lower quadrant discomfort with bloating and early satiety for at least 6 months.  He has also had some loose stools as well.  He denies weight loss but if anything he has been gaining weight.         He/She  has a past medical history of Hypertension.    · 2022 patient had thrombophilia work-up which showed an anticardiolipin antibodies basically negative, factor V Leiden was not mutated in a heterozygote fashion, fasting homocystine level was normal at 13.4, antiphosphatidylserine antibodies were negative, beta-2 glycoprotein was negative, Antithrombin III level was normal at 99, lupus anticoagulant was negative, protein C activity was normal at 99%,  "protein S activity was normal at 72%     Subjective      He is here for follow up. Slowly improving      ROS:    Review of Systems   Constitutional: Negative for chills, fatigue and fever.   HENT: Negative for congestion, drooling, ear discharge, rhinorrhea, sinus pressure and tinnitus.    Eyes: Negative for photophobia, pain and discharge.   Respiratory: Negative for apnea, choking and stridor.    Cardiovascular: Negative for palpitations.   Gastrointestinal: Negative for abdominal distention, abdominal pain and anal bleeding.   Endocrine: Negative for polydipsia and polyphagia.   Genitourinary: Negative for decreased urine volume, flank pain and genital sores.   Musculoskeletal: Negative for gait problem, neck pain and neck stiffness.   Skin: Negative for color change, rash and wound.   Neurological: Negative for tremors, seizures, syncope, facial asymmetry and speech difficulty.   Hematological: Negative for adenopathy.   Psychiatric/Behavioral: Negative for agitation, confusion, hallucinations and self-injury. The patient is not hyperactive.         MEDICATIONS:    Scheduled Meds:     Continuous Infusions:  No current facility-administered medications for this visit.     PRN Meds:       ALLERGIES:  No Known Allergies    Objective    VITALS:   /68   Pulse (!) 49   Temp 96.8 °F (36 °C)   Resp 18   Ht 172.7 cm (68\")   Wt 101 kg (222 lb 3.2 oz)   SpO2 97%   BMI 33.79 kg/m²     PHYSICAL EXAM: (performed by MD)  Physical Exam  Vitals and nursing note reviewed.   Constitutional:       General: He is not in acute distress.     Appearance: He is not diaphoretic.   HENT:      Head: Normocephalic and atraumatic.   Eyes:      General: No scleral icterus.        Right eye: No discharge.         Left eye: No discharge.      Conjunctiva/sclera: Conjunctivae normal.   Neck:      Thyroid: No thyromegaly.   Cardiovascular:      Rate and Rhythm: Normal rate and regular rhythm.      Heart sounds: Normal heart sounds. "     No friction rub. No gallop.   Pulmonary:      Effort: Pulmonary effort is normal. No respiratory distress.      Breath sounds: No stridor. No wheezing.   Abdominal:      General: Bowel sounds are normal.      Palpations: Abdomen is soft. There is no mass.      Tenderness: There is no abdominal tenderness. There is no guarding or rebound.   Musculoskeletal:         General: No tenderness. Normal range of motion.      Cervical back: Normal range of motion and neck supple.   Lymphadenopathy:      Cervical: No cervical adenopathy.   Skin:     General: Skin is warm.      Findings: No erythema or rash.   Neurological:      Mental Status: He is alert and oriented to person, place, and time.      Motor: No abnormal muscle tone.   Psychiatric:         Behavior: Behavior normal.         I have reexamined the patient and the results are consistent with the previously documented exam. Jennifer Koehler MD     RECENT LABS:    Lab Results (last 24 hours)     ** No results found for the last 24 hours. **          PENDING RESULTS:     IMAGING REVIEWED:    No radiology results for the last day    Assessment & Plan      ASSESSMENT:    1. Extensive multiple bilateral pulmonary emboli on IV heparin.  Etiology may be secondary to sedentary lifestyle due to back surgery, recent back surgery April 8, 2022, recent trip to [place] in March 2022.  No family history of blood clots.  Patient is currently on Eliquis 5 mg twice a day  2. 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  Discussed that this is inherited and blood relatives could have about this mutation and be at increased risk for clots.  Discussed it is important to share this information with the rest of his family members for their own personal screening.  Has access through [employer]  3. Left gastrocnemus subacute DVT  4. Bilateral nonobstructing kidney stones, mild distention of a segment of each ureter with no ureteral stones or obvious obstruction  noted.  Suspect could be congenital but will have urology see him while hospitalized.  5. Pain management: Discussed the need for patient to use only Tylenol and avoid nonsteroidal anti-inflammatory agents  6. Recent back surgery 4/8/2022  7. Mild thrombocytopenia: Resolved  8. Recent bilateral lower extremity swelling.  DVT on the left lower extremity  9. Left lower quadrant discomfort: CT scan is pending  10. Up-to-date on prostate exam, PSA.  Last colonoscopy 4 years ago was suboptimal     PLANS:    1. Hypercoagulable results reviewed with patient  2. Patient will need Lovenox bridge for procedures.  He will be scheduled for colonoscopy in June 2022  3. Reviewed CT scan of the abdomen and pelvis  4. Follow-up with urology service for kidney stones, ureteral dilatation  5. Reviewed Doppler study  6. Continue Eliquis 5 mg twice a day  7. Discussed with patient  8. All questions answered  9. Follow-up in 6 months        I spent 40 total minutes, face-to-face, caring for Manuel today.  80% of this time involved counseling and/or coordination of care as documented within this note, reviewing his records, imaging studies lab reports and coordinating care.

## 2022-05-19 LAB
ANTI-PHOSPHATIDIC ACID: NORMAL
ANTI-PHOSPHATIDYL GLYCEROL: NORMAL
ANTI-PHOSPHATIDYL INOSITOL: NORMAL
ANTI-PHOSPHATIDYLETHANOLAMINE: NORMAL
PE IGA SER-ACNC: 1.7 U/ML
PE IGG SER-ACNC: 2.1 U/ML
PE IGM SER-ACNC: 9 U/ML
PG IGA SER-ACNC: 2.4 U/ML
PG IGG SER-ACNC: 3.1 U/ML
PG IGM SER-ACNC: 4.1 U/ML
PHOSPHATIDATE IGA SER-ACNC: 1.7 U/ML
PHOSPHATIDATE IGG SER-ACNC: 3.7 U/ML
PHOSPHATIDATE IGM SER-ACNC: 5.7 U/ML
PI IGA SER-ACNC: 1.8 U/ML
PI IGG SER-ACNC: 3.5 U/ML
PI IGM SER-ACNC: 4.1 U/ML

## 2022-05-25 ENCOUNTER — APPOINTMENT (OUTPATIENT)
Dept: LAB | Facility: HOSPITAL | Age: 67
End: 2022-05-25

## 2022-06-01 ENCOUNTER — TELEPHONE (OUTPATIENT)
Dept: ONCOLOGY | Facility: CLINIC | Age: 67
End: 2022-06-01

## 2022-06-01 RX ORDER — ENOXAPARIN SODIUM 100 MG/ML
100 INJECTION SUBCUTANEOUS EVERY 12 HOURS SCHEDULED
Qty: 2 ML | Refills: 0 | Status: SHIPPED | OUTPATIENT
Start: 2022-06-01 | End: 2022-06-02

## 2022-06-01 NOTE — TELEPHONE ENCOUNTER
Clearance letter faxed back to [facility] to hold Eliquis 2 days prior to procedure and to start Lovenox 100 mg SQ every 12 hours 2 days prior to procedure, hold 24 hours before procedure and resume at surgeons discretion.

## 2022-06-13 NOTE — PRE-PROCEDURE INSTRUCTIONS
Pre op teaching completed. Pt in [place] to return Monday. Encouraged pt to make sure he has prep and instructions from office.

## 2022-06-20 ENCOUNTER — LAB (OUTPATIENT)
Dept: LAB | Facility: HOSPITAL | Age: 67
End: 2022-06-20

## 2022-06-20 ENCOUNTER — APPOINTMENT (OUTPATIENT)
Dept: LAB | Facility: HOSPITAL | Age: 67
End: 2022-06-20

## 2022-06-20 PROCEDURE — C9803 HOPD COVID-19 SPEC COLLECT: HCPCS

## 2022-06-20 PROCEDURE — U0004 COV-19 TEST NON-CDC HGH THRU: HCPCS

## 2022-06-21 ENCOUNTER — ANESTHESIA EVENT (OUTPATIENT)
Dept: GASTROENTEROLOGY | Facility: HOSPITAL | Age: 67
End: 2022-06-21

## 2022-06-21 LAB — SARS-COV-2 ORF1AB RESP QL NAA+PROBE: NOT DETECTED

## 2022-06-22 ENCOUNTER — HOSPITAL ENCOUNTER (OUTPATIENT)
Facility: HOSPITAL | Age: 67
Setting detail: HOSPITAL OUTPATIENT SURGERY
Discharge: HOME | End: 2022-06-22
Attending: INTERNAL MEDICINE | Admitting: INTERNAL MEDICINE

## 2022-06-22 ENCOUNTER — ON CAMPUS - OUTPATIENT (OUTPATIENT)
Dept: URBAN - METROPOLITAN AREA HOSPITAL 85 | Facility: HOSPITAL | Age: 67
End: 2022-06-22
Payer: COMMERCIAL

## 2022-06-22 ENCOUNTER — ANESTHESIA (OUTPATIENT)
Dept: GASTROENTEROLOGY | Facility: HOSPITAL | Age: 67
End: 2022-06-22

## 2022-06-22 VITALS
WEIGHT: 214 LBS | SYSTOLIC BLOOD PRESSURE: 113 MMHG | TEMPERATURE: 98.1 F | HEART RATE: 62 BPM | HEIGHT: 68 IN | OXYGEN SATURATION: 96 % | BODY MASS INDEX: 32.43 KG/M2 | RESPIRATION RATE: 16 BRPM | DIASTOLIC BLOOD PRESSURE: 80 MMHG

## 2022-06-22 DIAGNOSIS — Z80.0 FAMILY HISTORY OF COLON CANCER: ICD-10-CM

## 2022-06-22 DIAGNOSIS — D12.0 BENIGN NEOPLASM OF CECUM: ICD-10-CM

## 2022-06-22 DIAGNOSIS — K64.8 OTHER HEMORRHOIDS: ICD-10-CM

## 2022-06-22 DIAGNOSIS — K22.89 OTHER SPECIFIED DISEASE OF ESOPHAGUS: ICD-10-CM

## 2022-06-22 DIAGNOSIS — K44.9 DIAPHRAGMATIC HERNIA WITHOUT OBSTRUCTION OR GANGRENE: ICD-10-CM

## 2022-06-22 DIAGNOSIS — K57.30 DIVERTICULOSIS OF LARGE INTESTINE WITHOUT PERFORATION OR ABS: ICD-10-CM

## 2022-06-22 DIAGNOSIS — K21.9 GERD (GASTROESOPHAGEAL REFLUX DISEASE): ICD-10-CM

## 2022-06-22 DIAGNOSIS — R19.4 CHANGE IN BOWEL HABIT: ICD-10-CM

## 2022-06-22 DIAGNOSIS — K64.4 RESIDUAL HEMORRHOIDAL SKIN TAGS: ICD-10-CM

## 2022-06-22 DIAGNOSIS — K21.9 GASTRO-ESOPHAGEAL REFLUX DISEASE WITHOUT ESOPHAGITIS: ICD-10-CM

## 2022-06-22 DIAGNOSIS — Z80.0 FAMILY HISTORY OF MALIGNANT NEOPLASM OF DIGESTIVE ORGANS: ICD-10-CM

## 2022-06-22 DIAGNOSIS — K29.50 UNSPECIFIED CHRONIC GASTRITIS WITHOUT BLEEDING: ICD-10-CM

## 2022-06-22 DIAGNOSIS — D12.3 BENIGN NEOPLASM OF TRANSVERSE COLON: ICD-10-CM

## 2022-06-22 PROCEDURE — 25010000002 PROPOFOL 200 MG/20ML EMULSION

## 2022-06-22 PROCEDURE — 43239 EGD BIOPSY SINGLE/MULTIPLE: CPT | Performed by: INTERNAL MEDICINE

## 2022-06-22 PROCEDURE — 88305 TISSUE EXAM BY PATHOLOGIST: CPT | Performed by: INTERNAL MEDICINE

## 2022-06-22 PROCEDURE — 45385 COLONOSCOPY W/LESION REMOVAL: CPT | Performed by: INTERNAL MEDICINE

## 2022-06-22 RX ORDER — PROPOFOL 10 MG/ML
INJECTION, EMULSION INTRAVENOUS AS NEEDED
Status: DISCONTINUED | OUTPATIENT
Start: 2022-06-22 | End: 2022-06-22 | Stop reason: SURG

## 2022-06-22 RX ORDER — SODIUM CHLORIDE 0.9 % (FLUSH) 0.9 %
10 SYRINGE (ML) INJECTION AS NEEDED
Status: DISCONTINUED | OUTPATIENT
Start: 2022-06-22 | End: 2022-06-22 | Stop reason: HOSPADM

## 2022-06-22 RX ORDER — MIDAZOLAM HYDROCHLORIDE 1 MG/ML
0.5 INJECTION INTRAMUSCULAR; INTRAVENOUS
Status: DISCONTINUED | OUTPATIENT
Start: 2022-06-22 | End: 2022-06-22 | Stop reason: HOSPADM

## 2022-06-22 RX ORDER — SODIUM CHLORIDE 0.9 % (FLUSH) 0.9 %
10 SYRINGE (ML) INJECTION EVERY 12 HOURS SCHEDULED
Status: DISCONTINUED | OUTPATIENT
Start: 2022-06-22 | End: 2022-06-22 | Stop reason: HOSPADM

## 2022-06-22 RX ORDER — SODIUM CHLORIDE 9 MG/ML
INJECTION, SOLUTION INTRAVENOUS CONTINUOUS PRN
Status: DISCONTINUED | OUTPATIENT
Start: 2022-06-22 | End: 2022-06-22 | Stop reason: SURG

## 2022-06-22 RX ORDER — LIDOCAINE HYDROCHLORIDE 20 MG/ML
INJECTION, SOLUTION EPIDURAL; INFILTRATION; INTRACAUDAL; PERINEURAL AS NEEDED
Status: DISCONTINUED | OUTPATIENT
Start: 2022-06-22 | End: 2022-06-22 | Stop reason: SURG

## 2022-06-22 RX ORDER — SODIUM CHLORIDE 9 MG/ML
9 INJECTION, SOLUTION INTRAVENOUS CONTINUOUS PRN
Status: DISCONTINUED | OUTPATIENT
Start: 2022-06-22 | End: 2022-06-22 | Stop reason: HOSPADM

## 2022-06-22 RX ADMIN — SODIUM CHLORIDE: 0.9 INJECTION, SOLUTION INTRAVENOUS at 14:45

## 2022-06-22 RX ADMIN — LIDOCAINE HYDROCHLORIDE 180 MG: 20 INJECTION, SOLUTION EPIDURAL; INFILTRATION; INTRACAUDAL; PERINEURAL at 14:47

## 2022-06-22 RX ADMIN — PROPOFOL 500 MG: 10 INJECTION, EMULSION INTRAVENOUS at 14:47

## 2022-06-22 NOTE — ANESTHESIA POSTPROCEDURE EVALUATION
Patient: Manuel Rolle    Procedure Summary     Date: 06/22/22 Room / Location: [facility] ENDOSCOPY 4 / [facility] ENDOSCOPY    Anesthesia Start: 1445 Anesthesia Stop: 1526    Procedures:       ESOPHAGOGASTRODUODENOSCOPY with biopsy x1 area (N/A )      COLONOSCOPY with polypectomy x5 (N/A Rectum) Diagnosis:       GERD (gastroesophageal reflux disease)      Change in bowel habit      Family history of colon cancer      (GERD (gastroesophageal reflux disease) [K21.9])      (Change in bowel habit [R19.4])      (Family history of colon cancer [Z80.0])    Surgeons: Dahiana Brizuela MD Provider: Lauri Lara MD    Anesthesia Type: MAC ASA Status: 3          Anesthesia Type: MAC    Vitals  Vitals Value Taken Time   /80 06/22/22 1541   Temp     Pulse 62 06/22/22 1526   Resp 16 06/22/22 1541   SpO2 96 % 06/22/22 1541           Post Anesthesia Care and Evaluation    Patient location during evaluation: PACU  Patient participation: complete - patient participated  Level of consciousness: awake  Pain scale: See nurse's notes for pain score.  Pain management: adequate    Airway patency: patent  Anesthetic complications: No anesthetic complications  PONV Status: none  Cardiovascular status: acceptable  Respiratory status: acceptable  Hydration status: acceptable    Comments: Patient seen and examined postoperatively; vital signs stable; SpO2 greater than or equal to 90%; cardiopulmonary status stable; nausea/vomiting adequately controlled; pain adequately controlled; no apparent anesthesia complications; patient discharged from anesthesia care when discharge criteria were met

## 2022-06-22 NOTE — ANESTHESIA PREPROCEDURE EVALUATION
Anesthesia Evaluation     Patient summary reviewed and Nursing notes reviewed   NPO Solid Status: > 8 hours  NPO Liquid Status: > 8 hours           Airway   Mallampati: II  TM distance: >3 FB  No difficulty expected  Dental      Pulmonary    (+) pulmonary embolism,   Cardiovascular     PT is on anticoagulation therapy    (+) hypertension,       Neuro/Psych  GI/Hepatic/Renal/Endo    (+)  GERD,      Musculoskeletal     Abdominal    Substance History      OB/GYN          Other                      Anesthesia Plan    ASA 3     MAC     intravenous induction     Anesthetic plan, risks, benefits, and alternatives have been provided, discussed and informed consent has been obtained with: patient.        CODE STATUS:

## 2022-06-22 NOTE — DISCHARGE INSTRUCTIONS
A responsible adult should stay with you and you should rest quietly for the rest of the day.    Do not drink alcohol, drive, operate any heavy machinery or power tools or make any legal/important decisions for the next 24 hours.     Progress your diet as tolerated.  If you begin to experience severe pain, increased shortness of breath, racing heartbeat or a fever above 101 F, seek immediate medical attention.     Follow up with MD as instructed. Call office for results in 3 to 5 days if needed.     1.  Small hiatal hernia.  2.  Submucosal lesion was noticed around 25 cm in the esophagus likely from extrinsic compression cannot rule out mediastinal mass versus left bronchus need further evaluation with a CT of the chest.  3.  Multiple polyps were removed.  4.  Diverticulosis sigmoid descending colon area.  5.  Moderate-sized internal and external hemorrhoids.     Recommendations:  Follow up with GI clinic as needed  Follow up with PCP as scheduled  Follow up with biopsy report  Repeat colonoscopy in 3 years  Benefiber 1 scoop 2x/day   Patient will be scheduled to undergo CT scan of the chest to evaluate any mediastinal mass leading to extrinsic compression on the esophagus.  If needed EUS will be considered.  Keep office visit as scheduled.  Continue with the PPI.  Okay to restart anticoagulation tomorrow

## 2022-06-22 NOTE — H&P
Patient Care Team:  Lia Grove APRN as PCP - General (Nurse Practitioner)  Celso Nichole MD as Consulting Physician (Cardiology)      Subjective .     History of present illness:    Manuel Rolle is a 66 y.o. male who presents today for Procedure(s):  ESOPHAGOGASTRODUODENOSCOPY  COLONOSCOPY for the indications listed below.     The updated Patient Profile was reviewed prior to the procedure, in conjunction with the Physical Exam, including medical conditions, surgical procedures, medications, allergies, family history and social history.     Pre-operatively, I reviewed the indication(s) for the procedure, the risks of the procedure [including but not limited to: unexpected bleeding possibly requiring hospitalization and/or unplanned repeat procedures, perforation possibly requiring surgical treatment, missed lesions and complications of sedation/MAC (also explained by anesthesia staff)].     I have evaluated the patient for risks associated with the planned anesthesia and the procedure to be performed and find the patient an acceptable candidate for IV sedation.    Multiple opportunities were provided for any questions or concerns, and all questions were answered satisfactorily before any anesthesia was administered. We will proceed with the planned procedure.      ASSESSMENT/PLAN:  EGD  COLON    History of gastroesophageal reflux longstanding as well as left lower quadrant pain      Past Medical History:  Past Medical History:   Diagnosis Date   • CPAP (continuous positive airway pressure) dependence    • Enlarged prostate    • GERD (gastroesophageal reflux disease)    • Hypertension    • Pulmonary embolism (HCC) 04/2022    lungs       Past Surgical History:  Past Surgical History:   Procedure Laterality Date   • ANKLE SURGERY     • CARDIAC CATHETERIZATION     • CATARACT EXTRACTION Left    • COLONOSCOPY  2019   • HERNIA REPAIR         Social History:  Social History     Tobacco Use   • Smoking status: Former  Smoker   • Smokeless tobacco: Never Used   Vaping Use   • Vaping Use: Never used   Substance Use Topics   • Alcohol use: Yes     Comment: 1-2 a week   • Drug use: Never       Family History:  Family History   Problem Relation Age of Onset   • Heart disease Mother    • Hypertension Father        Medications:  Medications Prior to Admission   Medication Sig Dispense Refill Last Dose   • apixaban (ELIQUIS) 5 MG tablet tablet Take 1 tablet by mouth 2 (Two) Times a Day. Indications: DVT/PE (active thrombosis) 60 tablet 2 6/19/2022   • aspirin 81 MG EC tablet Take 81 mg by mouth Daily. Hold 2 days prior to sx 6/22/22 6/22/2022   • atorvastatin (LIPITOR) 20 MG tablet Take 20 mg by mouth Daily.      • benazepril (LOTENSIN) 20 MG tablet Take 1 tablet by mouth Daily.   6/22/2022 at Unknown time   • furosemide (LASIX) 20 MG tablet Take 20 mg by mouth Daily.   6/22/2022 at Unknown time   • glucosamine sulfate 500 MG capsule capsule Take  by mouth 3 (Three) Times a Day With Meals.   6/22/2022 at Unknown time   • methocarbamol (ROBAXIN) 750 MG tablet Take 750 mg by mouth 4 (Four) Times a Day As Needed.   6/22/2022 at Unknown time   • metoprolol succinate XL (TOPROL-XL) 50 MG 24 hr tablet Take 50 mg by mouth Daily.   6/22/2022 at Unknown time   • pantoprazole (PROTONIX) 40 MG EC tablet Take 40 mg by mouth Daily.   6/22/2022 at Unknown time   • polyethylene glycol (MIRALAX) 17 GM/SCOOP powder Take 17 g dissolved in 8 oz water by mouth Daily. 510 g 0 Past Week at Unknown time   • tamsulosin (FLOMAX) 0.4 MG capsule 24 hr capsule Take 1 capsule by mouth Daily.   6/22/2022 at Unknown time   • temazepam (RESTORIL) 15 MG capsule Take 15 mg by mouth every night at bedtime.   6/21/2022 at Unknown time   • terbinafine (lamiSIL) 250 MG tablet Take 250 mg by mouth Daily. For 12 weeks   6/22/2022 at Unknown time   • albuterol sulfate  (90 Base) MCG/ACT inhaler Inhale 2 puffs Every 4 (Four) Hours As Needed for Wheezing.   More than a  month at Unknown time   • buPROPion XL (WELLBUTRIN XL) 300 MG 24 hr tablet Take 300 mg by mouth Daily.      • docusate sodium (COLACE) 100 MG capsule Take 1 capsule by mouth 2 (Two) Times a Day. 60 capsule 0    • gabapentin (NEURONTIN) 300 MG capsule Take 300 mg by mouth 3 (Three) Times a Day.      • gabapentin (NEURONTIN) 600 MG tablet Take 1 tablet by mouth 3 (Three) Times a Day. 30 tablet 0    • HYDROcodone-acetaminophen (NORCO)  MG per tablet Take 1 tablet by mouth Every 6 (Six) Hours As Needed for Severe Pain . 16 tablet 0    • oxyCODONE (ROXICODONE) 5 MG immediate release tablet Take 1 tablet by mouth Every 8 (Eight) Hours As Needed (for breakthrough pain). 9 tablet 0        Scheduled Meds:sodium chloride, 10 mL, Intravenous, Q12H      Continuous Infusions:sodium chloride, 9 mL/hr      PRN Meds:.•  midazolam  •  sodium chloride  •  sodium chloride    ALLERGIES:  Patient has no known allergies.        Objective     Vital Signs:   Temp:  [98.1 °F (36.7 °C)] 98.1 °F (36.7 °C)  Heart Rate:  [66] 66  Resp:  [15] 15  BP: (113)/(71) 113/71    Physical Exam:      General Appearance:    Awake and alert, in no acute distress   Lungs:     Clear to auscultation bilaterally, respirations regular, even and unlabored    Heart:    Regular rhythm and normal rate, normal S1 and S2, no            murmur, no gallop, no rub   Abdomen:     Normal bowel sounds, soft, non-tender, no rebound or guarding, non-distended, no hepatosplenomegaly        Results Review:   I reviewed the patient's labs and imaging.  Lab Results (last 24 hours)     ** No results found for the last 24 hours. **          Imaging Results (Last 24 Hours)     ** No results found for the last 24 hours. **             I discussed the patients findings and my recommendations with the patient.  Dahiana Brizuela MD  06/22/22  14:41 EDT

## 2022-06-22 NOTE — OP NOTE
ESOPHAGOGASTRODUODENOSCOPY, COLONOSCOPY Procedure Report    Patient Name:  Manuel Rolle  YOB: 1955    Date of Surgery:  6/22/2022     Pre-Op Diagnosis:  GERD (gastroesophageal reflux disease) [K21.9]  Change in bowel habit [R19.4]  Family history of colon cancer [Z80.0]         Procedure/CPT® Codes:      Procedure(s):  ESOPHAGOGASTRODUODENOSCOPY with biopsy x1 area  COLONOSCOPY with polypectomy x5    Staff:  Surgeon(s):  Dahiana Brizuela MD      Anesthesia: Monitored Anesthesia Care    Description of Procedure:  A description of the procedure as well as risks, benefits and alternative methods were explained to the patient who voiced understanding and signed the corresponding consent form. A physical exam was performed and vital signs were monitored throughout the procedure.   scope was advanced under direct realization from oropharynx, esophagus stomach and the second part of the duodenum.  Slowly scope was withdrawn, retroflexion lamination was performed.  Esophagus gastric dual mucosa was well-visualized.  A rectal exam was performed which was normal. An Olympus colonoscope was placed into the rectum and proceeded under direct visualization through the colon until the cecum and appendiceal orifice were identified. Careful visualization occurred upon slow withdraw of the scope. The scope was then retroflexed and the distal rectum was visualized. The quality of the prep was good. The procedure was not difficult and there were no immediate complications.    Findings:   Hiatal hernia was noticed on upper endoscopy examination close to 2 3 cm.  There was also extrinsic compression most likely from posterior mediastinum at 25 cm leading to the impression on the esophagus which may need a further evaluation.  Gastric mucosa showed changes consistent with gastritis biopsy was performed.  Duodenal mucosa looks normal first and the second part of duodenum.    Terminal ileum cecum ascending colon mucosa  was well-visualized.  Normal terminal ileum.  In cecum, 3 polyps were removed 1 was close to 1 to 1.5 cm removed with a hot snare polypectomy technique.  The other 2 there were 5 and 7 mm removed with a cold snare.  2 more polyp was removed from the hepatic flexure and transverse colon area with a hot snare.  The measured on 6 and 8 mm.  Diverticulosis sigmoid descending colon was noted with a moderate-sized internal and external hemorrhoid.  Patient Toller procedure very well no immediate complication was noticed.    Impression:  1.  Small hiatal hernia.  2.  Submucosal lesion was noticed around 25 cm in the esophagus likely from extrinsic compression cannot rule out mediastinal mass versus left bronchus need further evaluation with a CT of the chest.  3.  Multiple polyps were removed.  4.  Diverticulosis sigmoid descending colon area.  5.  Moderate-sized internal and external hemorrhoids.    Recommendations:  Follow up with GI clinic as needed  Follow up with PCP as scheduled  Follow up with biopsy report  Repeat colonoscopy in 3 years  Benefiber 1 scoop 2x/day   Patient will be scheduled to undergo CT scan of the chest to evaluate any mediastinal mass leading to extrinsic compression on the esophagus.  If needed EUS will be considered.  Keep office visit as scheduled.  Continue with the PPI.  Okay to restart anticoagulation tomorrow      Dahiana Brizuela MD     Date: 6/22/2022    Time: 15:24 EDT

## 2022-06-24 LAB
LAB AP CASE REPORT: NORMAL
PATH REPORT.FINAL DX SPEC: NORMAL
PATH REPORT.GROSS SPEC: NORMAL

## 2022-07-11 ENCOUNTER — TRANSCRIBE ORDERS (OUTPATIENT)
Dept: ADMINISTRATIVE | Facility: HOSPITAL | Age: 67
End: 2022-07-11

## 2022-07-11 DIAGNOSIS — I26.99 MULTIPLE PULMONARY EMBOLI: Primary | ICD-10-CM

## 2022-07-25 ENCOUNTER — HOSPITAL ENCOUNTER (OUTPATIENT)
Dept: CT IMAGING | Facility: HOSPITAL | Age: 67
Discharge: HOME | End: 2022-07-25
Admitting: NURSE PRACTITIONER

## 2022-07-25 DIAGNOSIS — I26.99 MULTIPLE PULMONARY EMBOLI: ICD-10-CM

## 2022-07-25 LAB
CREAT BLDA-MCNC: 1.1 MG/DL (ref 0.6–1.3)
EGFRCR SERPLBLD CKD-EPI 2021: 73.6 ML/MIN/1.73

## 2022-07-25 PROCEDURE — 82565 ASSAY OF CREATININE: CPT

## 2022-07-25 PROCEDURE — 71275 CT ANGIOGRAPHY CHEST: CPT

## 2022-07-25 PROCEDURE — 0 IOPAMIDOL PER 1 ML: Performed by: NURSE PRACTITIONER

## 2022-07-25 RX ADMIN — IOPAMIDOL 100 ML: 755 INJECTION, SOLUTION INTRAVENOUS at 12:11

## 2022-08-02 ENCOUNTER — OFFICE (OUTPATIENT)
Dept: URBAN - METROPOLITAN AREA CLINIC 64 | Facility: CLINIC | Age: 67
End: 2022-08-02

## 2022-08-02 VITALS
HEIGHT: 68 IN | WEIGHT: 216 LBS | SYSTOLIC BLOOD PRESSURE: 112 MMHG | HEART RATE: 58 BPM | DIASTOLIC BLOOD PRESSURE: 66 MMHG

## 2022-08-02 DIAGNOSIS — R15.0 INCOMPLETE DEFECATION: ICD-10-CM

## 2022-08-02 DIAGNOSIS — K64.9 UNSPECIFIED HEMORRHOIDS: ICD-10-CM

## 2022-08-02 PROCEDURE — 99213 OFFICE O/P EST LOW 20 MIN: CPT | Performed by: NURSE PRACTITIONER

## 2022-08-02 RX ORDER — HYDROCORTISONE 25 MG/G
OINTMENT TOPICAL
Qty: 20 | Refills: 2 | Status: ACTIVE
Start: 2022-08-02

## 2022-11-29 ENCOUNTER — LAB (OUTPATIENT)
Dept: LAB | Facility: HOSPITAL | Age: 67
End: 2022-11-29

## 2022-11-29 ENCOUNTER — OFFICE VISIT (OUTPATIENT)
Dept: ONCOLOGY | Facility: CLINIC | Age: 67
End: 2022-11-29

## 2022-11-29 VITALS
WEIGHT: 205 LBS | TEMPERATURE: 96.8 F | HEART RATE: 73 BPM | DIASTOLIC BLOOD PRESSURE: 83 MMHG | SYSTOLIC BLOOD PRESSURE: 154 MMHG | HEIGHT: 68 IN | RESPIRATION RATE: 18 BRPM | BODY MASS INDEX: 31.07 KG/M2

## 2022-11-29 DIAGNOSIS — I26.94 MULTIPLE SUBSEGMENTAL PULMONARY EMBOLI WITHOUT ACUTE COR PULMONALE: Primary | ICD-10-CM

## 2022-11-29 PROBLEM — I25.84 CORONARY ARTERY CALCIFICATION: Status: ACTIVE | Noted: 2021-07-09

## 2022-11-29 PROBLEM — Z09 STATUS POST LEFT INGUINAL HERNIA REPAIR, FOLLOW-UP EXAM: Status: ACTIVE | Noted: 2017-07-26

## 2022-11-29 PROBLEM — I25.10 CORONARY ARTERY CALCIFICATION: Status: ACTIVE | Noted: 2021-07-09

## 2022-11-29 PROBLEM — J40 BRONCHITIS: Status: ACTIVE | Noted: 2021-07-09

## 2022-11-29 PROBLEM — R53.83 FATIGUE: Status: ACTIVE | Noted: 2021-07-09

## 2022-11-29 LAB
BASOPHILS # BLD AUTO: 0.01 10*3/MM3 (ref 0–0.2)
BASOPHILS NFR BLD AUTO: 0.1 % (ref 0–1.5)
DEPRECATED RDW RBC AUTO: 44 FL (ref 37–54)
EOSINOPHIL # BLD AUTO: 0.01 10*3/MM3 (ref 0–0.4)
EOSINOPHIL NFR BLD AUTO: 0.1 % (ref 0.3–6.2)
ERYTHROCYTE [DISTWIDTH] IN BLOOD BY AUTOMATED COUNT: 13.5 % (ref 12.3–15.4)
HCT VFR BLD AUTO: 47.6 % (ref 37.5–51)
HGB BLD-MCNC: 16.3 G/DL (ref 13–17.7)
HOLD SPECIMEN: NORMAL
LYMPHOCYTES # BLD AUTO: 1.74 10*3/MM3 (ref 0.7–3.1)
LYMPHOCYTES NFR BLD AUTO: 17.2 % (ref 19.6–45.3)
MCH RBC QN AUTO: 30.6 PG (ref 26.6–33)
MCHC RBC AUTO-ENTMCNC: 34.2 G/DL (ref 31.5–35.7)
MCV RBC AUTO: 89.3 FL (ref 79–97)
MONOCYTES # BLD AUTO: 0.81 10*3/MM3 (ref 0.1–0.9)
MONOCYTES NFR BLD AUTO: 8 % (ref 5–12)
NEUTROPHILS NFR BLD AUTO: 7.54 10*3/MM3 (ref 1.7–7)
NEUTROPHILS NFR BLD AUTO: 74.6 % (ref 42.7–76)
PLATELET # BLD AUTO: 169 10*3/MM3 (ref 140–450)
PMV BLD AUTO: 10.6 FL (ref 6–12)
RBC # BLD AUTO: 5.33 10*6/MM3 (ref 4.14–5.8)
WBC NRBC COR # BLD: 10.11 10*3/MM3 (ref 3.4–10.8)
WHOLE BLOOD HOLD COAG: NORMAL

## 2022-11-29 PROCEDURE — 99214 OFFICE O/P EST MOD 30 MIN: CPT | Performed by: INTERNAL MEDICINE

## 2022-11-29 PROCEDURE — 85025 COMPLETE CBC W/AUTO DIFF WBC: CPT

## 2022-11-29 PROCEDURE — 36415 COLL VENOUS BLD VENIPUNCTURE: CPT

## 2022-11-29 RX ORDER — FLUOXETINE 10 MG/1
10 TABLET, FILM COATED ORAL DAILY
COMMUNITY
Start: 2022-11-17

## 2022-11-29 RX ORDER — ZOLPIDEM TARTRATE 10 MG/1
10 TABLET ORAL NIGHTLY
COMMUNITY
Start: 2022-11-22

## 2022-11-29 RX ORDER — OMEPRAZOLE 40 MG/1
40 CAPSULE, DELAYED RELEASE ORAL DAILY
COMMUNITY
Start: 2022-11-23

## 2022-11-29 RX ORDER — METHYLPREDNISOLONE 4 MG/1
TABLET ORAL
COMMUNITY
Start: 2022-09-28

## 2023-05-31 ENCOUNTER — OFFICE VISIT (OUTPATIENT)
Dept: ONCOLOGY | Facility: CLINIC | Age: 68
End: 2023-05-31

## 2023-05-31 ENCOUNTER — LAB (OUTPATIENT)
Dept: LAB | Facility: HOSPITAL | Age: 68
End: 2023-05-31

## 2023-05-31 VITALS
HEIGHT: 68 IN | TEMPERATURE: 98 F | HEART RATE: 87 BPM | DIASTOLIC BLOOD PRESSURE: 80 MMHG | SYSTOLIC BLOOD PRESSURE: 127 MMHG | OXYGEN SATURATION: 95 % | BODY MASS INDEX: 30.62 KG/M2 | RESPIRATION RATE: 18 BRPM | WEIGHT: 202 LBS

## 2023-05-31 DIAGNOSIS — I26.94 MULTIPLE SUBSEGMENTAL PULMONARY EMBOLI WITHOUT ACUTE COR PULMONALE: Primary | ICD-10-CM

## 2023-05-31 LAB
ALBUMIN SERPL-MCNC: 4.3 G/DL (ref 3.5–5.2)
ALBUMIN/GLOB SERPL: 1.8 G/DL
ALP SERPL-CCNC: 77 U/L (ref 39–117)
ALT SERPL W P-5'-P-CCNC: 23 U/L (ref 1–41)
ANION GAP SERPL CALCULATED.3IONS-SCNC: 12 MMOL/L (ref 5–15)
AST SERPL-CCNC: 28 U/L (ref 1–40)
BASOPHILS # BLD AUTO: 0 10*3/MM3 (ref 0–0.2)
BASOPHILS NFR BLD AUTO: 0 % (ref 0–1.5)
BILIRUB SERPL-MCNC: 0.5 MG/DL (ref 0–1.2)
BUN SERPL-MCNC: 18 MG/DL (ref 8–23)
BUN/CREAT SERPL: 16.7 (ref 7–25)
CALCIUM SPEC-SCNC: 9.4 MG/DL (ref 8.6–10.5)
CHLORIDE SERPL-SCNC: 102 MMOL/L (ref 98–107)
CO2 SERPL-SCNC: 27 MMOL/L (ref 22–29)
CREAT SERPL-MCNC: 1.08 MG/DL (ref 0.76–1.27)
DEPRECATED RDW RBC AUTO: 42.4 FL (ref 37–54)
EGFRCR SERPLBLD CKD-EPI 2021: 75.2 ML/MIN/1.73
EOSINOPHIL # BLD AUTO: 0.02 10*3/MM3 (ref 0–0.4)
EOSINOPHIL NFR BLD AUTO: 0.2 % (ref 0.3–6.2)
ERYTHROCYTE [DISTWIDTH] IN BLOOD BY AUTOMATED COUNT: 12.9 % (ref 12.3–15.4)
GLOBULIN UR ELPH-MCNC: 2.4 GM/DL
GLUCOSE SERPL-MCNC: 94 MG/DL (ref 65–99)
HCT VFR BLD AUTO: 47.2 % (ref 37.5–51)
HGB BLD-MCNC: 15.6 G/DL (ref 13–17.7)
HOLD SPECIMEN: NORMAL
HOLD SPECIMEN: NORMAL
LYMPHOCYTES # BLD AUTO: 1.74 10*3/MM3 (ref 0.7–3.1)
LYMPHOCYTES NFR BLD AUTO: 20.7 % (ref 19.6–45.3)
MCH RBC QN AUTO: 30.6 PG (ref 26.6–33)
MCHC RBC AUTO-ENTMCNC: 33.1 G/DL (ref 31.5–35.7)
MCV RBC AUTO: 92.5 FL (ref 79–97)
MONOCYTES # BLD AUTO: 0.73 10*3/MM3 (ref 0.1–0.9)
MONOCYTES NFR BLD AUTO: 8.7 % (ref 5–12)
NEUTROPHILS NFR BLD AUTO: 5.93 10*3/MM3 (ref 1.7–7)
NEUTROPHILS NFR BLD AUTO: 70.4 % (ref 42.7–76)
PLATELET # BLD AUTO: 153 10*3/MM3 (ref 140–450)
PMV BLD AUTO: 10.3 FL (ref 6–12)
POTASSIUM SERPL-SCNC: 4.3 MMOL/L (ref 3.5–5.2)
PROT SERPL-MCNC: 6.7 G/DL (ref 6–8.5)
RBC # BLD AUTO: 5.1 10*6/MM3 (ref 4.14–5.8)
SODIUM SERPL-SCNC: 141 MMOL/L (ref 136–145)
WBC NRBC COR # BLD: 8.42 10*3/MM3 (ref 3.4–10.8)

## 2023-05-31 PROCEDURE — 85025 COMPLETE CBC W/AUTO DIFF WBC: CPT

## 2023-05-31 PROCEDURE — 80053 COMPREHEN METABOLIC PANEL: CPT | Performed by: INTERNAL MEDICINE

## 2023-05-31 PROCEDURE — 36415 COLL VENOUS BLD VENIPUNCTURE: CPT

## 2023-05-31 RX ORDER — BENAZEPRIL HYDROCHLORIDE 20 MG/1
20 TABLET ORAL DAILY
COMMUNITY

## 2023-05-31 RX ORDER — HYDROXYZINE HYDROCHLORIDE 25 MG/1
25 TABLET, FILM COATED ORAL 3 TIMES DAILY PRN
COMMUNITY

## 2023-05-31 RX ORDER — ESCITALOPRAM OXALATE 10 MG/1
1 TABLET ORAL DAILY
COMMUNITY
Start: 2023-03-27

## 2023-05-31 NOTE — PROGRESS NOTES
Hematology/Oncology Follow-up    PATIENT NAME: Manuel Rolle  : 1955  MRN: [id number]    CHIEF COMPLAINT: Upper back pain    HISTORY OF PRESENT ILLNESS:      Manuel Rolle is a 67 y.o. male who presented to [facility] on 2022 with complaints of upper back pain to the emergency room.  Patient just had back surgery on  to remove a synovial cyst by Dr. Corcoran.  He had a D-dimer done in the emergency room which was elevated to 8.81.  He subsequently had CT scan of the chest which basically revealed extensive bilateral pulmonary arterial emboli there was no pathologically enlarged lymph nodes.  No other abnormalities were detected.  He was then placed on IV heparin and I have been consulted for his pulmonary embolism.     22  Hematology/Oncology was consulted pulmonary embolism.  It has no prior history of blood clots and there is no family history.  He does report multiple surgeries recently the major 1 being the back surgery.  He also had the cystic lesion removed, cataract surgery.  Patient was on a recent road trip to [place].     There is family history of colon cancer and uterine cancer.     His last colonoscopy was approximately 4 years ago.  According to patient and his daughter, his prep was not optimal.  He has had left lower quadrant discomfort with bloating and early satiety for at least 6 months.  He has also had some loose stools as well.  He denies weight loss but if anything he has been gaining weight.         He/She  has a past medical history of Hypertension.    · 2022 patient had thrombophilia work-up which showed an anticardiolipin antibodies basically negative, factor V Leiden was not mutated in a heterozygote fashion, fasting homocystine level was normal at 13.4, antiphosphatidylserine antibodies were negative, beta-2 glycoprotein was negative, Antithrombin III level was normal at 99, lupus anticoagulant was negative, protein C activity was normal at 99%,  "protein S activity was normal at 72%     Subjective      Patient is here today for routine follow-up and does not have any new issues.  He denies any bleeding issues.  He remains on Eliquis has been asked to take 2.5 mg twice a day      ROS:    Review of Systems   Constitutional: Negative for chills, fatigue and fever.   HENT: Negative for congestion, drooling, ear discharge, rhinorrhea, sinus pressure and tinnitus.    Eyes: Negative for photophobia, pain and discharge.   Respiratory: Negative for apnea, choking and stridor.    Cardiovascular: Negative for palpitations.   Gastrointestinal: Negative for abdominal distention, abdominal pain and anal bleeding.   Endocrine: Negative for polydipsia and polyphagia.   Genitourinary: Negative for decreased urine volume, flank pain and genital sores.   Musculoskeletal: Negative for gait problem, neck pain and neck stiffness.   Skin: Negative for color change, rash and wound.   Neurological: Negative for tremors, seizures, syncope, facial asymmetry and speech difficulty.   Hematological: Negative for adenopathy.   Psychiatric/Behavioral: Negative for agitation, confusion, hallucinations and self-injury. The patient is not hyperactive.         MEDICATIONS:    Scheduled Meds:     Continuous Infusions:  No current facility-administered medications for this visit.     PRN Meds:       ALLERGIES:  No Known Allergies    Objective    VITALS:   /80   Pulse 87   Temp 98 °F (36.7 °C) (Infrared)   Resp 18   Ht 172.7 cm (68\")   Wt 91.6 kg (202 lb)   SpO2 95%   BMI 30.71 kg/m²     PHYSICAL EXAM: (performed by MD)  Physical Exam  Vitals and nursing note reviewed.   Constitutional:       General: He is not in acute distress.     Appearance: He is not diaphoretic.   HENT:      Head: Normocephalic and atraumatic.   Eyes:      General: No scleral icterus.        Right eye: No discharge.         Left eye: No discharge.      Conjunctiva/sclera: Conjunctivae normal.   Neck:      " Thyroid: No thyromegaly.   Cardiovascular:      Rate and Rhythm: Normal rate and regular rhythm.      Heart sounds: Normal heart sounds.     No friction rub. No gallop.   Pulmonary:      Effort: Pulmonary effort is normal. No respiratory distress.      Breath sounds: No stridor. No wheezing.   Abdominal:      General: Bowel sounds are normal.      Palpations: Abdomen is soft. There is no mass.      Tenderness: There is no abdominal tenderness. There is no guarding or rebound.   Musculoskeletal:         General: No tenderness. Normal range of motion.      Cervical back: Normal range of motion and neck supple.   Lymphadenopathy:      Cervical: No cervical adenopathy.   Skin:     General: Skin is warm.      Findings: No erythema or rash.   Neurological:      Mental Status: He is alert and oriented to person, place, and time.      Motor: No abnormal muscle tone.   Psychiatric:         Behavior: Behavior normal.         I have reexamined the patient and the results are consistent with the previously documented exam. Jennifer Koehler MD     RECENT LABS:    Lab Results (last 24 hours)     ** No results found for the last 24 hours. **          PENDING RESULTS:     IMAGING REVIEWED:    No radiology results for the last day    Assessment & Plan      ASSESSMENT:    1. Extensive multiple bilateral pulmonary emboli on IV heparin.  Etiology may be secondary to sedentary lifestyle due to back surgery, recent back surgery April 8, 2022, recent trip to [place] in March 2022.  No family history of blood clots.  Patient is currently on Eliquis 5 mg twice a day.  Patient is asking if he can discontinue.  I have recommended at least transitioning to a lower dose for prophylasix.  Patient will continue Eliquis 2.5 mg twice a day  2. 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  Discussed that this is inherited and blood relatives could have about this mutation and be at increased risk for clots.  Discussed it is  important to share this information with the rest of his family members for their own personal screening.  Reviewed  3. Left gastrocnemus subacute DVT.  Resolved  4. Bilateral nonobstructing kidney stones, mild distention of a segment of each ureter with no ureteral stones or obvious obstruction noted.  Suspect could be congenital but will have urology see him while hospitalized.  5. Pain management: Discussed the need for patient to use only Tylenol and avoid nonsteroidal anti-inflammatory agents  6. Recent back surgery 4/8/2022.  Patient follow-up with his surgeon  7. Mild thrombocytopenia: Resolved  8. Recent bilateral lower extremity swelling.  DVT on the left lower extremity  9. Up-to-date on prostate exam, PSA.  Last colonoscopy 4 years ago was suboptimal     PLANS:    1. Continue Eliquis 2.5 mg twice a day  2. Patient will need Lovenox bridge for procedures.  He has completed his colonoscopy as recommended in June 2022  3. Sent to notify me of any surgical procedures in the future  4. Reviewed CT scan of the abdomen and pelvis  5. Follow-up with urology service for kidney stones, ureteral dilatation.  Reviewed  6. CMP today  7. Patient follow-up with his PCP who he sees every 6 months  8. We will now see him on a yearly basis or earlier as needed        I spent 30 total minutes, face-to-face, caring for Manuel today. 90% of this time involved counseling and/or coordination of care as documented within this note reviewing his records and formulating management plans.

## 2023-08-28 ENCOUNTER — OFFICE VISIT (OUTPATIENT)
Dept: SPORTS MEDICINE | Facility: CLINIC | Age: 68
End: 2023-08-28
Payer: MEDICARE

## 2023-08-28 VITALS — HEIGHT: 68 IN | HEART RATE: 48 BPM | WEIGHT: 202 LBS | OXYGEN SATURATION: 95 % | BODY MASS INDEX: 30.62 KG/M2

## 2023-08-28 DIAGNOSIS — S80.11XA CONTUSION OF RIGHT TIBIA: ICD-10-CM

## 2023-08-28 DIAGNOSIS — M25.561 ACUTE PAIN OF RIGHT KNEE: Primary | ICD-10-CM

## 2023-08-28 DIAGNOSIS — M17.11 PRIMARY OSTEOARTHRITIS OF RIGHT KNEE: ICD-10-CM

## 2023-08-28 RX ORDER — APIXABAN 5 MG/1
1 TABLET, FILM COATED ORAL DAILY
COMMUNITY
Start: 2023-08-09

## 2023-08-28 RX ORDER — TEMAZEPAM 30 MG/1
30 CAPSULE ORAL NIGHTLY PRN
COMMUNITY
Start: 2023-08-22

## 2023-08-28 NOTE — PROGRESS NOTES
"NEW VISIT    Patient: Manuel Rolle  ?  YOB: 1955    MRN: [id number]  ?  Chief Complaint   Patient presents with    Right Knee - Follow-up      ?  HPI: Patient is a 68-year-old male who presents today for chief complaint of right knee pain.  He has known knee osteoarthritis for which he obtains periodic corticosteroid injections by his PCP, the last of which was 8/16/2023 with relief of his osteoarthritic pain.  His main complaint today is of anterior knee pain secondary to a fall while playing pickle ball onto anterior aspect of a flexed knee 3 months ago.  Since that time he has had a \"quarter sized\" area of tenderness over the lateral tibial tubercle that has gradually improved, but is still painful only while he is kneeling over the affected area.  It is not preventing him from doing other daily activities, or continuing to play pickleball, which he does 3 times a week.  He reports only occurs when he kneels on a hard surface over the anterior knee, or pushes over the affected area.  He does state that the soreness has slightly improved since the initial injury.  Denies any swelling, catching, feelings of instability, or loss of range of motion.  Has been KT taping over the area, but no other treatment at this time.      Allergies: No Known Allergies    Past Medical History:   Diagnosis Date    CPAP (continuous positive airway pressure) dependence     Enlarged prostate     GERD (gastroesophageal reflux disease)     Hypertension     Pulmonary embolism 04/2022    lungs     Past Surgical History:   Procedure Laterality Date    ANKLE SURGERY      CARDIAC CATHETERIZATION      CATARACT EXTRACTION Left     COLONOSCOPY  2019    COLONOSCOPY N/A 6/22/2022    Procedure: COLONOSCOPY with polypectomy x5;  Surgeon: Dahiana Brizuela MD;  Location: [facility] ENDOSCOPY;  Service: Gastroenterology;  Laterality: N/A;  post: colon polyps, diverticulosis, hemorrhoids    ENDOSCOPY N/A 6/22/2022    Procedure: " "ESOPHAGOGASTRODUODENOSCOPY with biopsy x1 area;  Surgeon: Dahiana Brizuela MD;  Location: [facility] ENDOSCOPY;  Service: Gastroenterology;  Laterality: N/A;  post: hiatal hernia, gastritis    HERNIA REPAIR       Social History     Occupational History    Not on file   Tobacco Use    Smoking status: Former    Smokeless tobacco: Never   Vaping Use    Vaping Use: Never used   Substance and Sexual Activity    Alcohol use: Yes     Comment: 1-2 a week    Drug use: Never    Sexual activity: Defer      Social History     Social History Narrative    Not on file     Family History   Problem Relation Age of Onset    Heart disease Mother     Hypertension Father        Review of Systems  Constitutional: Negative.  Negative for fever.   Musculoskeletal: Positive for joint pain  Skin: Negative.  Negative for rash and wound.    Neurological: Negative for numbness.     Vitals:    08/28/23 1355   Pulse: (!) 48   SpO2: 95%   Weight: 91.6 kg (202 lb)   Height: 172.7 cm (68\")        Physical Exam  Constitutional: Patient is oriented to person, place, and time. Appears well-developed and well-nourished.   Head: Normocephalic and atraumatic.   Pulmonary/Chest: Effort normal.   Musculoskeletal:   See detailed exam below   Neurological: Alert and oriented to person, place, and time. No sensory deficit. Coordination normal.   Skin: Skin is warm and dry. Capillary refill takes less than 2 seconds. No rash noted. No erythema.     The right knee is without obvious signs of acute bony deformity, quadriceps atrophy, swelling, erythema, or ecchymosis. No joint effusion. The patella is without tenderness. Apprehension is negative with medial and lateral glide. Patella crepitus is positive. Patella grind is positive. The medial joint line is mildly tender to palpation. The lateral joint line is nontender to palpation.  There is an area of focal tenderness over the lateral tibial tubercle.  There is no surrounding bruising, swelling, crepitus or sign of " infection.  Other soft tissue including the distal hamstring tendons, pes anserine, quad tendon, patellar tendon, proximal gastroc tendon, distal IT band, and Gerdy's tubercle are nontender. Flexion & extension are full without pain.  Knee strength is 5/5 secondary to pain. Varus & valgus stress, anterior drawer, Sean's, and posterior drawer are all negative. The opposite knee is nontender and stable. Gait is nonpainful and tandem      Diagnostics:  xrays obtained today     Right Knee X-Ray  Indication: Pain    Views: AP, Lateral, and [place]    Findings:  No fracture  No bony lesion  Normal soft tissues  There is joint space narrowing with subchondral sclerosis osteophyte formation most significant in the medial and patellofemoral compartments.  Endings consistent with moderate osteoarthritic change.      Assessment:  Diagnoses and all orders for this visit:    1. Acute pain of right knee (Primary)  -     XR Knee 3 View Right    2. Contusion of right tibia    3. Primary osteoarthritis of right knee        Plan    Exam consistent with bony contusion and sensitivity of tibial tubercle.  Extensor mechanism intact.  Ligamentous and meniscal exam unremarkable.  Minimal arthritic pain today given recent corticosteroid injection less than 2 weeks ago.  Activity modifications discussed and recommended.  Specifically discussed padded kneepads with any repetitive kneeling activity in order to avoid direct pressure over the tibial tubercle currently in healing process.  Discussed can take multiple months for bone bruising to heal, and that area may remain sensitive due to superficial sensory nerve injury.  Rest, ice, compression, and elevation (RICE) therapy.   Discussed topical Voltaren cream as needed over area of sensitivity over tibial tubercle  Follow up as needed    Date of encounter: 08/28/2023   Rex Bañuelos DO    Electronically signed by Rex Bañuelos DO, 08/28/23, 2:28 PM EDT.    Disclaimer: Please note that  areas of this note were completed with computer voice recognition software.  Quite often unanticipated grammatical, syntax, homophones, and other interpretive errors are inadvertently transcribed by the computer software. Please excuse any errors that have escaped final proofreading.

## 2023-08-31 ENCOUNTER — PATIENT ROUNDING (BHMG ONLY) (OUTPATIENT)
Dept: SPORTS MEDICINE | Facility: CLINIC | Age: 68
End: 2023-08-31
Payer: MEDICARE

## 2024-05-10 ENCOUNTER — TELEPHONE (OUTPATIENT)
Dept: ONCOLOGY | Facility: CLINIC | Age: 69
End: 2024-05-10
Payer: MEDICARE

## 2024-05-31 NOTE — PROGRESS NOTES
Hematology/Oncology Follow-up    PATIENT NAME: Manuel Rolle  : 1955  MRN: [id number]    CHIEF COMPLAINT: Upper back pain    HISTORY OF PRESENT ILLNESS:      Manuel Rolle is a 67 y.o. male who presented to [facility] on 2022 with complaints of upper back pain to the emergency room.  Patient just had back surgery on  to remove a synovial cyst by Dr. Corcoran.  He had a D-dimer done in the emergency room which was elevated to 8.81.  He subsequently had CT scan of the chest which basically revealed extensive bilateral pulmonary arterial emboli there was no pathologically enlarged lymph nodes.  No other abnormalities were detected.  He was then placed on IV heparin and I have been consulted for his pulmonary embolism.     22  Hematology/Oncology was consulted pulmonary embolism.  It has no prior history of blood clots and there is no family history.  He does report multiple surgeries recently the major 1 being the back surgery.  He also had the cystic lesion removed, cataract surgery.  Patient was on a recent road trip to [place].     There is family history of colon cancer and uterine cancer.     His last colonoscopy was approximately 4 years ago.  According to patient and his daughter, his prep was not optimal.  He has had left lower quadrant discomfort with bloating and early satiety for at least 6 months.  He has also had some loose stools as well.  He denies weight loss but if anything he has been gaining weight.         He/She  has a past medical history of Hypertension.    2022 patient had thrombophilia work-up which showed an anticardiolipin antibodies basically negative, factor V Leiden was not mutated in a heterozygote fashion, fasting homocystine level was normal at 13.4, antiphosphatidylserine antibodies were negative, beta-2 glycoprotein was negative, Antithrombin III level was normal at 99, lupus anticoagulant was negative, protein C activity was normal at 99%,  "protein S activity was normal at 72%    [id number] CT angiogram chest  IMPRESSION:     1. No pulmonary emboli. A thin fibrous web remains within a right lower  lobe pulmonary artery from previous pulmonary embolism.  2. No acute airspace disease         Subjective      This is here today for routine annual follow-up.  He has no new issues or concerns.  He denies bleeding or bruising.  He remains on Eliquis 2.5 mg twice daily with good compliance.  He is concerned about financial toxicities related to monthly cost.      ROS:    Review of Systems   Constitutional:  Negative for fatigue and fever.   HENT:  Negative for congestion and nosebleeds.    Eyes:  Negative for pain.   Respiratory:  Negative for cough and shortness of breath.    Cardiovascular:  Negative for chest pain.   Gastrointestinal:  Negative for abdominal pain, blood in stool, diarrhea, nausea and vomiting.   Endocrine: Negative for cold intolerance and heat intolerance.   Genitourinary:  Negative for difficulty urinating.   Musculoskeletal:  Negative for arthralgias.   Skin:  Negative for rash.   Neurological:  Negative for dizziness and headaches.   Hematological:  Does not bruise/bleed easily.   Psychiatric/Behavioral:  Negative for behavioral problems.         MEDICATIONS:    Scheduled Meds:     Continuous Infusions:  No current facility-administered medications for this visit.     PRN Meds:       ALLERGIES:  No Known Allergies    Objective    VITALS:   /70   Pulse 98   Temp 98.4 °F (36.9 °C) (Temporal)   Ht 172.7 cm (67.99\")   Wt 93.2 kg (205 lb 6.4 oz)   SpO2 95%   BMI 31.24 kg/m²     PHYSICAL EXAM: (performed by MD)  Physical Exam  Vitals reviewed.   Constitutional:       Appearance: Normal appearance.   HENT:      Head: Normocephalic and atraumatic.   Eyes:      Pupils: Pupils are equal, round, and reactive to light.   Cardiovascular:      Rate and Rhythm: Normal rate and regular rhythm.      Pulses: Normal pulses.      Heart sounds: No " murmur heard.  Pulmonary:      Effort: Pulmonary effort is normal.      Breath sounds: Normal breath sounds.   Abdominal:      General: There is no distension.      Palpations: Abdomen is soft. There is no mass.      Tenderness: There is no abdominal tenderness.   Musculoskeletal:         General: Normal range of motion.      Cervical back: Normal range of motion and neck supple.   Skin:     General: Skin is warm.      Findings: No bruising or erythema.   Neurological:      General: No focal deficit present.      Mental Status: He is alert and oriented to person, place, and time.   Psychiatric:         Mood and Affect: Mood normal.         I have reexamined the patient and the results are consistent with the previously documented exam. Shayy Thomas PA-C     RECENT LABS:  Lab Results   Component Value Date    WBC 8.92 06/03/2024    HGB 13.1 06/03/2024    HCT 41.6 06/03/2024    MCV 87.2 06/03/2024     06/03/2024           IMAGING REVIEWED:    No radiology results for the last day    Assessment & Plan      ASSESSMENT:    Extensive multiple bilateral pulmonary emboli on IV heparin.  Etiology may be secondary to sedentary lifestyle due to back surgery, recent back surgery April 8, 2022, recent trip to [place] in March 2022.  No family history of blood clots.  Patient was previously on Eliquis 5 mg twice a day.  Patient askedif he can discontinue.  Recommended at least transitioning to a lower dose for prophylasix.  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  Discussed that this is inherited and blood relatives could have about this mutation and be at increased risk for clots.  Discussed it is important to share this information with the rest of his family members for their own personal screening.  Reviewed  Left gastrocnemius subacute DVT.  Resolved  Bilateral nonobstructing kidney stones, mild distention of a segment of each ureter with  no ureteral stones or obvious obstruction noted.  Urology as directed  Pain management: Discussed the need for patient to use only Tylenol and avoid nonsteroidal anti-inflammatory agents  Back surgery 4/8/2022.  Patient follow-up with his surgeon  Mild thrombocytopenia: Resolved       PLANS:    Reviewed CBC  CMP pending  Continue Eliquis 2.5 mg twice a day - will ask financial counselor to meet with patient  Patient will need Lovenox bridge for procedures.  He has completed his colonoscopy as recommended in June 2022  Patient notify of any surgical procedures in the future  Continue to follow-up with urology service for kidney stones, ureteral dilatation, as directed.  Reviewed  Patient follow-up with his PCP who he sees every 6 months  Follow up with Dr. Koehler in 1 year, sooner if condition indicates    I have answered all his questions to his satisfaction.     I have reviewed labs results, imaging, vitals, and medications with the patient today.       Patient verbalized understanding and is in agreement of the above plan.    Electronically signed by Shayy Thomas PA-C          I spent 30 total minutes, face-to-face, caring for Manuel today. 90% of this time involved counseling and/or coordination of care as documented within this note reviewing his records and formulating management plans.

## 2024-06-03 ENCOUNTER — OFFICE VISIT (OUTPATIENT)
Dept: ONCOLOGY | Facility: CLINIC | Age: 69
End: 2024-06-03
Payer: MEDICARE

## 2024-06-03 ENCOUNTER — LAB (OUTPATIENT)
Dept: LAB | Facility: HOSPITAL | Age: 69
End: 2024-06-03
Payer: MEDICARE

## 2024-06-03 VITALS
WEIGHT: 205.4 LBS | DIASTOLIC BLOOD PRESSURE: 70 MMHG | TEMPERATURE: 98.4 F | HEIGHT: 68 IN | SYSTOLIC BLOOD PRESSURE: 109 MMHG | OXYGEN SATURATION: 95 % | BODY MASS INDEX: 31.13 KG/M2 | HEART RATE: 98 BPM

## 2024-06-03 DIAGNOSIS — I26.94 MULTIPLE SUBSEGMENTAL PULMONARY EMBOLI WITHOUT ACUTE COR PULMONALE: Primary | ICD-10-CM

## 2024-06-03 DIAGNOSIS — Z79.01 CURRENT USE OF LONG TERM ANTICOAGULATION: ICD-10-CM

## 2024-06-03 LAB
ALBUMIN SERPL-MCNC: 4.1 G/DL (ref 3.5–5.2)
ALBUMIN/GLOB SERPL: 1.6 G/DL
ALP SERPL-CCNC: 66 U/L (ref 39–117)
ALT SERPL W P-5'-P-CCNC: 17 U/L (ref 1–41)
ANION GAP SERPL CALCULATED.3IONS-SCNC: 10.3 MMOL/L (ref 5–15)
AST SERPL-CCNC: 21 U/L (ref 1–40)
BASOPHILS # BLD AUTO: 0.01 10*3/MM3 (ref 0–0.2)
BASOPHILS NFR BLD AUTO: 0.1 % (ref 0–1.5)
BILIRUB SERPL-MCNC: 0.4 MG/DL (ref 0–1.2)
BUN SERPL-MCNC: 19 MG/DL (ref 8–23)
BUN/CREAT SERPL: 18.6 (ref 7–25)
CALCIUM SPEC-SCNC: 9.4 MG/DL (ref 8.6–10.5)
CHLORIDE SERPL-SCNC: 100 MMOL/L (ref 98–107)
CO2 SERPL-SCNC: 26.7 MMOL/L (ref 22–29)
CREAT SERPL-MCNC: 1.02 MG/DL (ref 0.76–1.27)
DEPRECATED RDW RBC AUTO: 46.2 FL (ref 37–54)
EGFRCR SERPLBLD CKD-EPI 2021: 80.1 ML/MIN/1.73
EOSINOPHIL # BLD AUTO: 0.03 10*3/MM3 (ref 0–0.4)
EOSINOPHIL NFR BLD AUTO: 0.3 % (ref 0.3–6.2)
ERYTHROCYTE [DISTWIDTH] IN BLOOD BY AUTOMATED COUNT: 14.8 % (ref 12.3–15.4)
GLOBULIN UR ELPH-MCNC: 2.5 GM/DL
GLUCOSE SERPL-MCNC: 122 MG/DL (ref 65–99)
HCT VFR BLD AUTO: 41.6 % (ref 37.5–51)
HGB BLD-MCNC: 13.1 G/DL (ref 13–17.7)
HOLD SPECIMEN: NORMAL
LYMPHOCYTES # BLD AUTO: 1.36 10*3/MM3 (ref 0.7–3.1)
LYMPHOCYTES NFR BLD AUTO: 15.2 % (ref 19.6–45.3)
MCH RBC QN AUTO: 27.5 PG (ref 26.6–33)
MCHC RBC AUTO-ENTMCNC: 31.5 G/DL (ref 31.5–35.7)
MCV RBC AUTO: 87.2 FL (ref 79–97)
MONOCYTES # BLD AUTO: 0.72 10*3/MM3 (ref 0.1–0.9)
MONOCYTES NFR BLD AUTO: 8.1 % (ref 5–12)
NEUTROPHILS NFR BLD AUTO: 6.8 10*3/MM3 (ref 1.7–7)
NEUTROPHILS NFR BLD AUTO: 76.3 % (ref 42.7–76)
PLATELET # BLD AUTO: 173 10*3/MM3 (ref 140–450)
PMV BLD AUTO: 10.2 FL (ref 6–12)
POTASSIUM SERPL-SCNC: 3.9 MMOL/L (ref 3.5–5.2)
PROT SERPL-MCNC: 6.6 G/DL (ref 6–8.5)
RBC # BLD AUTO: 4.77 10*6/MM3 (ref 4.14–5.8)
SODIUM SERPL-SCNC: 137 MMOL/L (ref 136–145)
WBC NRBC COR # BLD AUTO: 8.92 10*3/MM3 (ref 3.4–10.8)
WHOLE BLOOD HOLD COAG: NORMAL

## 2024-06-03 PROCEDURE — 1159F MED LIST DOCD IN RCRD: CPT | Performed by: PHYSICIAN ASSISTANT

## 2024-06-03 PROCEDURE — 36415 COLL VENOUS BLD VENIPUNCTURE: CPT

## 2024-06-03 PROCEDURE — 3078F DIAST BP <80 MM HG: CPT | Performed by: PHYSICIAN ASSISTANT

## 2024-06-03 PROCEDURE — 85025 COMPLETE CBC W/AUTO DIFF WBC: CPT

## 2024-06-03 PROCEDURE — 99214 OFFICE O/P EST MOD 30 MIN: CPT | Performed by: PHYSICIAN ASSISTANT

## 2024-06-03 PROCEDURE — 1126F AMNT PAIN NOTED NONE PRSNT: CPT | Performed by: PHYSICIAN ASSISTANT

## 2024-06-03 PROCEDURE — 80053 COMPREHEN METABOLIC PANEL: CPT | Performed by: PHYSICIAN ASSISTANT

## 2024-06-03 PROCEDURE — 3074F SYST BP LT 130 MM HG: CPT | Performed by: PHYSICIAN ASSISTANT

## 2024-06-03 PROCEDURE — 1160F RVW MEDS BY RX/DR IN RCRD: CPT | Performed by: PHYSICIAN ASSISTANT

## 2024-06-03 RX ORDER — OMEPRAZOLE 40 MG/1
CAPSULE, DELAYED RELEASE ORAL
COMMUNITY
Start: 2024-02-29

## 2024-06-04 ENCOUNTER — TRANSCRIBE ORDERS (OUTPATIENT)
Dept: ADMINISTRATIVE | Facility: HOSPITAL | Age: 69
End: 2024-06-04
Payer: MEDICARE

## 2024-06-04 DIAGNOSIS — Z87.891 PERSONAL HISTORY OF NICOTINE DEPENDENCE: Primary | ICD-10-CM

## 2024-07-18 ENCOUNTER — HOSPITAL ENCOUNTER (OUTPATIENT)
Dept: CT IMAGING | Facility: HOSPITAL | Age: 69
Discharge: HOME | End: 2024-07-18
Admitting: NURSE PRACTITIONER
Payer: MEDICARE

## 2024-07-18 DIAGNOSIS — Z87.891 PERSONAL HISTORY OF NICOTINE DEPENDENCE: ICD-10-CM

## 2024-07-18 PROCEDURE — 71271 CT THORAX LUNG CANCER SCR C-: CPT

## 2024-10-07 ENCOUNTER — TELEPHONE (OUTPATIENT)
Dept: CARDIOLOGY | Facility: CLINIC | Age: 69
End: 2024-10-07
Payer: MEDICARE

## 2024-10-07 NOTE — TELEPHONE ENCOUNTER
Called patient, no answer, left voicemail.       Please schedule patient with Dr. Nichole or Lorena BENAVIDES.

## 2024-10-07 NOTE — TELEPHONE ENCOUNTER
Caller: Manuel Rolle    Relationship to patient: Self    Best call back number: [phone]    Patient is needing: REPORTS INCREASING SOB. LAST SEEN 2022 NEED ADVISEMENT ON SCHEDULING.

## 2024-10-08 NOTE — TELEPHONE ENCOUNTER
Called patient, he gets dizzy and light headed and shortness of breath. Hub made an appointment 11/12/24. Spoke with scheduling got him an appointment tomorrow at 11 am.

## 2024-10-09 ENCOUNTER — OFFICE VISIT (OUTPATIENT)
Dept: CARDIOLOGY | Facility: CLINIC | Age: 69
End: 2024-10-09
Payer: MEDICARE

## 2024-10-09 VITALS
BODY MASS INDEX: 30.16 KG/M2 | WEIGHT: 199 LBS | DIASTOLIC BLOOD PRESSURE: 85 MMHG | HEART RATE: 50 BPM | HEIGHT: 68 IN | SYSTOLIC BLOOD PRESSURE: 129 MMHG | OXYGEN SATURATION: 98 %

## 2024-10-09 DIAGNOSIS — R06.02 SHORTNESS OF BREATH: ICD-10-CM

## 2024-10-09 DIAGNOSIS — I20.9 ANGINA PECTORIS: ICD-10-CM

## 2024-10-09 DIAGNOSIS — I25.10 CORONARY ARTERY DISEASE INVOLVING NATIVE CORONARY ARTERY OF NATIVE HEART WITHOUT ANGINA PECTORIS: Primary | ICD-10-CM

## 2024-10-09 DIAGNOSIS — I26.99 OTHER PULMONARY EMBOLISM WITHOUT ACUTE COR PULMONALE, UNSPECIFIED CHRONICITY: ICD-10-CM

## 2024-10-09 DIAGNOSIS — G47.33 OBSTRUCTIVE SLEEP APNEA: ICD-10-CM

## 2024-10-09 DIAGNOSIS — I10 PRIMARY HYPERTENSION: ICD-10-CM

## 2024-10-09 RX ORDER — METOPROLOL SUCCINATE 50 MG/1
25 TABLET, EXTENDED RELEASE ORAL DAILY
Qty: 90 TABLET | Refills: 0 | Status: SHIPPED | OUTPATIENT
Start: 2024-10-09

## 2024-10-09 NOTE — PROGRESS NOTES
"    Subjective:     Encounter Date:10/09/2024      Patient ID: Manuel Rolle is a 69 y.o. male.    Chief Complaint:  History of Present Illness 69-year-old white male with history of coronary artery disease hypertension pulmonary embolism and probably sleep apnea presents to my office for a follow-up.  Patient is currently having symptoms of chest pain which she describes as a substernal discomfort with associated shortness of breath.  No complaint any PND orthopnea.  No palpitation dizziness syncope or swelling of the feet.  He has been taking his medicines regularly.  He does not smoke    The following portions of the patient's history were reviewed and updated as appropriate: allergies, current medications, past family history, past medical history, past social history, past surgical history, and problem list.  Past Medical History:   Diagnosis Date    CPAP (continuous positive airway pressure) dependence     Enlarged prostate     GERD (gastroesophageal reflux disease)     Hypertension     Pulmonary embolism 04/2022    lungs     Past Surgical History:   Procedure Laterality Date    ANKLE SURGERY      CARDIAC CATHETERIZATION      CATARACT EXTRACTION Left     COLONOSCOPY  2019    COLONOSCOPY N/A 6/22/2022    Procedure: COLONOSCOPY with polypectomy x5;  Surgeon: Dahiana Brizuela MD;  Location: [facility] ENDOSCOPY;  Service: Gastroenterology;  Laterality: N/A;  post: colon polyps, diverticulosis, hemorrhoids    ENDOSCOPY N/A 6/22/2022    Procedure: ESOPHAGOGASTRODUODENOSCOPY with biopsy x1 area;  Surgeon: Dahiana Brizuela MD;  Location: [facility] ENDOSCOPY;  Service: Gastroenterology;  Laterality: N/A;  post: hiatal hernia, gastritis    HERNIA REPAIR       /85   Pulse 50   Ht 172.7 cm (67.99\")   Wt 90.3 kg (199 lb)   SpO2 98%   BMI 30.26 kg/m²   Family History   Problem Relation Age of Onset    Heart disease Mother     Hypertension Father        Current Outpatient Medications:     apixaban (ELIQUIS) 2.5 MG tablet " tablet, Take 1 tablet by mouth 2 (Two) Times a Day., Disp: 180 tablet, Rfl: 4    aspirin 81 MG EC tablet, Take 1 tablet by mouth Daily. Hold 2 days prior to sx 6/22/22, Disp: , Rfl:     benazepril (LOTENSIN) 20 MG tablet, Take 1 tablet by mouth Daily., Disp: , Rfl:     escitalopram (LEXAPRO) 10 MG tablet, Take 1 tablet by mouth Daily., Disp: , Rfl:     furosemide (LASIX) 20 MG tablet, Take 1 tablet by mouth Daily., Disp: , Rfl:     glucosamine sulfate 500 MG capsule capsule, Take  by mouth 3 (Three) Times a Day With Meals., Disp: , Rfl:     metoprolol succinate XL (TOPROL-XL) 50 MG 24 hr tablet, Take 0.5 tablets by mouth Daily., Disp: , Rfl:     omeprazole (priLOSEC) 40 MG capsule, TAKE 1 CAPSULE ONCE DAILY. MUST BE SEEN FOR APPT., Disp: , Rfl:     pantoprazole (PROTONIX) 40 MG EC tablet, Take 1 tablet by mouth Daily., Disp: , Rfl:     tamsulosin (FLOMAX) 0.4 MG capsule 24 hr capsule, Take 1 capsule by mouth Daily., Disp: , Rfl:     temazepam (RESTORIL) 30 MG capsule, Take 1 capsule by mouth At Night As Needed., Disp: , Rfl:     albuterol sulfate  (90 Base) MCG/ACT inhaler, Inhale 2 puffs Every 4 (Four) Hours As Needed for Wheezing. (Patient not taking: Reported on 10/9/2024), Disp: , Rfl:     atorvastatin (LIPITOR) 20 MG tablet, Take 1 tablet by mouth Daily. (Patient not taking: Reported on 10/9/2024), Disp: , Rfl:     benazepril (LOTENSIN) 20 MG tablet, Take 1 tablet by mouth Daily. (Patient not taking: Reported on 10/9/2024), Disp: , Rfl:     Eliquis 5 MG tablet tablet, Take 1 tablet by mouth Daily. (Patient not taking: Reported on 10/9/2024), Disp: , Rfl:     hydrOXYzine (ATARAX) 25 MG tablet, Take 1 tablet by mouth 3 (Three) Times a Day As Needed for Itching. (Patient not taking: Reported on 10/9/2024), Disp: , Rfl:   No Known Allergies  Social History     Socioeconomic History    Marital status:    Tobacco Use    Smoking status: Former    Smokeless tobacco: Never   Vaping Use    Vaping status:  Never Used   Substance and Sexual Activity    Alcohol use: Yes     Comment: 1-2 a week    Drug use: Never    Sexual activity: Defer     Review of Systems   Constitutional: Positive for malaise/fatigue. Negative for fever.   HENT:  Negative for ear pain and nosebleeds.    Eyes:  Negative for blurred vision and double vision.   Cardiovascular:  Positive for chest pain. Negative for dyspnea on exertion, leg swelling and palpitations.   Respiratory:  Positive for shortness of breath. Negative for cough.    Skin:  Negative for rash.   Musculoskeletal:  Negative for joint pain.   Gastrointestinal:  Negative for abdominal pain, nausea and vomiting.   Neurological:  Positive for dizziness and light-headedness. Negative for focal weakness, headaches and numbness.   Psychiatric/Behavioral:  Negative for depression. The patient is not nervous/anxious.    All other systems reviewed and are negative.             Objective:     Constitutional:       Appearance: Well-developed.   Eyes:      General: No scleral icterus.     Conjunctiva/sclera: Conjunctivae normal.   HENT:      Head: Normocephalic and atraumatic.   Neck:      Vascular: No carotid bruit or JVD.   Pulmonary:      Effort: Pulmonary effort is normal.      Breath sounds: Normal breath sounds. No wheezing. No rales.   Cardiovascular:      Normal rate. Regular rhythm.   Pulses:     Intact distal pulses.   Abdominal:      General: Bowel sounds are normal.      Palpations: Abdomen is soft.   Musculoskeletal:      Cervical back: Normal range of motion and neck supple. Skin:     General: Skin is warm and dry.      Findings: No rash.   Neurological:      Mental Status: Alert.         ECG 12 Lead    Date/Time: 10/9/2024 11:35 AM  Performed by: Celso Nichole MD    Authorized by: Celso Nichole MD  Comments: Sinus bradycardia  Borderline ECG  No previous ECGs available          Lab Review:         MDM    #1 angina/CAD  Patient.  With chest pain and shortness of breath and has  history of CAD with a 50% disease in LAD and a 70% disease in a small diagonal branch  Patient will have a stress moistly to rule out ischemia.  2.  Hypertension  Patient blood pressure currently stable on metoprolol and benazepril  3.  History of pulmonary embolism  Patient is on Eliquis and followed by the primary care doctor  4.  Sleep apnea  Pain is sleep apnea and uses a CPAP machine    Patient's previous medical records, labs, and EKG were reviewed and discussed with the patient at today's visit.

## 2024-10-11 ENCOUNTER — HOSPITAL ENCOUNTER (OUTPATIENT)
Dept: CARDIOLOGY | Facility: HOSPITAL | Age: 69
Discharge: HOME | End: 2024-10-11
Payer: MEDICARE

## 2024-10-11 DIAGNOSIS — R06.02 SHORTNESS OF BREATH: ICD-10-CM

## 2024-10-11 DIAGNOSIS — I25.10 CORONARY ARTERY DISEASE INVOLVING NATIVE CORONARY ARTERY OF NATIVE HEART WITHOUT ANGINA PECTORIS: ICD-10-CM

## 2024-10-11 DIAGNOSIS — I10 PRIMARY HYPERTENSION: ICD-10-CM

## 2024-10-11 DIAGNOSIS — I20.9 ANGINA PECTORIS: ICD-10-CM

## 2024-10-11 DIAGNOSIS — I26.99 OTHER PULMONARY EMBOLISM WITHOUT ACUTE COR PULMONALE, UNSPECIFIED CHRONICITY: ICD-10-CM

## 2024-10-11 DIAGNOSIS — G47.33 OBSTRUCTIVE SLEEP APNEA: ICD-10-CM

## 2024-10-11 LAB
BH CV REST NUCLEAR ISOTOPE DOSE: 10.2 MCI
BH CV STRESS BP STAGE 1: NORMAL
BH CV STRESS COMMENTS STAGE 1: NORMAL
BH CV STRESS DOSE REGADENOSON STAGE 1: 0.4
BH CV STRESS DURATION MIN STAGE 1: 0
BH CV STRESS DURATION SEC STAGE 1: 10
BH CV STRESS HR STAGE 1: 47
BH CV STRESS NUCLEAR ISOTOPE DOSE: 32.3 MCI
BH CV STRESS PROTOCOL 1: NORMAL
BH CV STRESS RECOVERY BP: NORMAL MMHG
BH CV STRESS RECOVERY HR: 77 BPM
BH CV STRESS STAGE 1: 1
LV EF NUC BP: 75 %
MAXIMAL PREDICTED HEART RATE: 151 BPM
STRESS BASELINE BP: NORMAL MMHG
STRESS BASELINE HR: 47 BPM
STRESS TARGET HR: 128 BPM

## 2024-10-11 PROCEDURE — 78452 HT MUSCLE IMAGE SPECT MULT: CPT

## 2024-10-11 PROCEDURE — 0 TECHNETIUM SESTAMIBI: Performed by: INTERNAL MEDICINE

## 2024-10-11 PROCEDURE — 93017 CV STRESS TEST TRACING ONLY: CPT

## 2024-10-11 PROCEDURE — A9500 TC99M SESTAMIBI: HCPCS | Performed by: INTERNAL MEDICINE

## 2024-10-11 PROCEDURE — 25010000002 REGADENOSON 0.4 MG/5ML SOLUTION: Performed by: INTERNAL MEDICINE

## 2024-10-11 RX ORDER — REGADENOSON 0.08 MG/ML
0.4 INJECTION, SOLUTION INTRAVENOUS
Status: COMPLETED | OUTPATIENT
Start: 2024-10-11 | End: 2024-10-11

## 2024-10-11 RX ADMIN — REGADENOSON 0.4 MG: 0.08 INJECTION, SOLUTION INTRAVENOUS at 10:36

## 2024-10-11 RX ADMIN — TECHNETIUM TC 99M SESTAMIBI 1 DOSE: 1 INJECTION INTRAVENOUS at 10:36

## 2024-10-11 RX ADMIN — TECHNETIUM TC 99M SESTAMIBI 1 DOSE: 1 INJECTION INTRAVENOUS at 08:43

## 2024-10-21 ENCOUNTER — TELEPHONE (OUTPATIENT)
Dept: CARDIOLOGY | Facility: CLINIC | Age: 69
End: 2024-10-21
Payer: MEDICARE

## 2024-11-12 ENCOUNTER — TELEPHONE (OUTPATIENT)
Dept: CARDIOLOGY | Facility: CLINIC | Age: 69
End: 2024-11-12

## 2024-11-12 DIAGNOSIS — R06.02 SHORTNESS OF BREATH: Primary | ICD-10-CM

## 2024-11-12 NOTE — TELEPHONE ENCOUNTER
Patient came into the office stating that he is wanting a call to discuss his calcium score and heart cath. He is also having SOA.    BP readings    Taking     metoprolol 25 mg daily  Benazepril 20 mg daily    152/90  60    149/76  50    160/83  65    138/73  82    149/77  72    161/80  56    152/82  54    158/85  54    138/68  53

## 2024-11-13 NOTE — TELEPHONE ENCOUNTER
Returned patient's call.  Further discussed recent Myoview study results in detail, he verbally understood.  However, patient is concerned as he is still having dyspnea on exertion and extreme fatigue.  He is having trouble completing day to day tasks due to symptoms. Despite compliance with CPAP and sleeping 8 to 9 hours every night.    Echocardiogram ordered    Please call patient to schedule

## 2024-11-21 ENCOUNTER — HOSPITAL ENCOUNTER (OUTPATIENT)
Dept: CARDIOLOGY | Facility: HOSPITAL | Age: 69
Discharge: HOME | End: 2024-11-21
Payer: MEDICARE

## 2024-11-21 VITALS
DIASTOLIC BLOOD PRESSURE: 51 MMHG | BODY MASS INDEX: 30.16 KG/M2 | SYSTOLIC BLOOD PRESSURE: 117 MMHG | HEART RATE: 47 BPM | HEIGHT: 68 IN | WEIGHT: 199 LBS

## 2024-11-21 DIAGNOSIS — R06.02 SHORTNESS OF BREATH: ICD-10-CM

## 2024-11-21 DIAGNOSIS — I34.0 NONRHEUMATIC MITRAL VALVE REGURGITATION: ICD-10-CM

## 2024-11-21 DIAGNOSIS — R93.1 ABNORMAL NUCLEAR CARDIAC IMAGING TEST: ICD-10-CM

## 2024-11-21 DIAGNOSIS — I20.89 ANGINA AT REST: Primary | ICD-10-CM

## 2024-11-21 LAB
BH CV ECHO MEAS - ACS: 2.8 CM
BH CV ECHO MEAS - AO MAX PG: 6.8 MMHG
BH CV ECHO MEAS - AO MEAN PG: 3.8 MMHG
BH CV ECHO MEAS - AO ROOT DIAM: 3.9 CM
BH CV ECHO MEAS - AO V2 MAX: 130.9 CM/SEC
BH CV ECHO MEAS - AO V2 VTI: 32.1 CM
BH CV ECHO MEAS - AVA(I,D): 2.09 CM2
BH CV ECHO MEAS - EDV(CUBED): 133 ML
BH CV ECHO MEAS - EDV(MOD-SP4): 75.7 ML
BH CV ECHO MEAS - EF(MOD-BP): 55 %
BH CV ECHO MEAS - EF(MOD-SP4): 54.8 %
BH CV ECHO MEAS - ESV(CUBED): 44.5 ML
BH CV ECHO MEAS - ESV(MOD-SP4): 34.2 ML
BH CV ECHO MEAS - FS: 30.6 %
BH CV ECHO MEAS - IVS/LVPW: 0.92 CM
BH CV ECHO MEAS - IVSD: 1.11 CM
BH CV ECHO MEAS - LA DIMENSION: 4.3 CM
BH CV ECHO MEAS - LV MASS(C)D: 230.2 GRAMS
BH CV ECHO MEAS - LV MAX PG: 2.1 MMHG
BH CV ECHO MEAS - LV MEAN PG: 1.24 MMHG
BH CV ECHO MEAS - LV V1 MAX: 72.4 CM/SEC
BH CV ECHO MEAS - LV V1 VTI: 18.1 CM
BH CV ECHO MEAS - LVIDD: 5.1 CM
BH CV ECHO MEAS - LVIDS: 3.5 CM
BH CV ECHO MEAS - LVOT AREA: 3.7 CM2
BH CV ECHO MEAS - LVOT DIAM: 2.17 CM
BH CV ECHO MEAS - LVPWD: 1.21 CM
BH CV ECHO MEAS - MR MAX PG: 132.4 MMHG
BH CV ECHO MEAS - MR MAX VEL: 573.8 CM/SEC
BH CV ECHO MEAS - MV A MAX VEL: 65.6 CM/SEC
BH CV ECHO MEAS - MV DEC SLOPE: 307.9 CM/SEC2
BH CV ECHO MEAS - MV DEC TIME: 0.23 SEC
BH CV ECHO MEAS - MV E MAX VEL: 70.2 CM/SEC
BH CV ECHO MEAS - MV E/A: 1.07
BH CV ECHO MEAS - MV MAX PG: 3 MMHG
BH CV ECHO MEAS - MV MEAN PG: 1.19 MMHG
BH CV ECHO MEAS - MV V2 VTI: 31.2 CM
BH CV ECHO MEAS - MVA(VTI): 2.15 CM2
BH CV ECHO MEAS - PA V2 MAX: 83.8 CM/SEC
BH CV ECHO MEAS - PI END-D VEL: 105 CM/SEC
BH CV ECHO MEAS - RAP SYSTOLE: 8 MMHG
BH CV ECHO MEAS - RV MAX PG: 1.04 MMHG
BH CV ECHO MEAS - RV V1 MAX: 50.9 CM/SEC
BH CV ECHO MEAS - RV V1 VTI: 12.3 CM
BH CV ECHO MEAS - RVSP: 24.4 MMHG
BH CV ECHO MEAS - SV(LVOT): 67.1 ML
BH CV ECHO MEAS - SV(MOD-SP4): 41.5 ML
BH CV ECHO MEAS - TR MAX PG: 16.4 MMHG
BH CV ECHO MEAS - TR MAX VEL: 200.5 CM/SEC

## 2024-11-21 PROCEDURE — 93306 TTE W/DOPPLER COMPLETE: CPT | Performed by: INTERNAL MEDICINE

## 2024-11-21 PROCEDURE — 93306 TTE W/DOPPLER COMPLETE: CPT

## 2024-11-22 ENCOUNTER — LAB (OUTPATIENT)
Dept: LAB | Facility: HOSPITAL | Age: 69
End: 2024-11-22
Payer: MEDICARE

## 2024-11-22 DIAGNOSIS — R93.1 ABNORMAL NUCLEAR CARDIAC IMAGING TEST: ICD-10-CM

## 2024-11-22 DIAGNOSIS — I20.89 ANGINA AT REST: ICD-10-CM

## 2024-11-22 DIAGNOSIS — I34.0 NONRHEUMATIC MITRAL VALVE REGURGITATION: ICD-10-CM

## 2024-11-22 LAB
ANION GAP SERPL CALCULATED.3IONS-SCNC: 10.9 MMOL/L (ref 5–15)
BASOPHILS # BLD AUTO: 0.01 10*3/MM3 (ref 0–0.2)
BASOPHILS NFR BLD AUTO: 0.2 % (ref 0–1.5)
BUN SERPL-MCNC: 17 MG/DL (ref 8–23)
BUN/CREAT SERPL: 15.9 (ref 7–25)
CALCIUM SPEC-SCNC: 9 MG/DL (ref 8.6–10.5)
CHLORIDE SERPL-SCNC: 103 MMOL/L (ref 98–107)
CO2 SERPL-SCNC: 28.1 MMOL/L (ref 22–29)
CREAT SERPL-MCNC: 1.07 MG/DL (ref 0.76–1.27)
DEPRECATED RDW RBC AUTO: 45.9 FL (ref 37–54)
EGFRCR SERPLBLD CKD-EPI 2021: 75.1 ML/MIN/1.73
EOSINOPHIL # BLD AUTO: 0.06 10*3/MM3 (ref 0–0.4)
EOSINOPHIL NFR BLD AUTO: 1.1 % (ref 0.3–6.2)
ERYTHROCYTE [DISTWIDTH] IN BLOOD BY AUTOMATED COUNT: 15.2 % (ref 12.3–15.4)
GLUCOSE SERPL-MCNC: 73 MG/DL (ref 65–99)
HCT VFR BLD AUTO: 38.7 % (ref 37.5–51)
HGB BLD-MCNC: 11.4 G/DL (ref 13–17.7)
IMM GRANULOCYTES # BLD AUTO: 0.01 10*3/MM3 (ref 0–0.05)
IMM GRANULOCYTES NFR BLD AUTO: 0.2 % (ref 0–0.5)
LYMPHOCYTES # BLD AUTO: 1.56 10*3/MM3 (ref 0.7–3.1)
LYMPHOCYTES NFR BLD AUTO: 28.1 % (ref 19.6–45.3)
MCH RBC QN AUTO: 24.6 PG (ref 26.6–33)
MCHC RBC AUTO-ENTMCNC: 29.5 G/DL (ref 31.5–35.7)
MCV RBC AUTO: 83.4 FL (ref 79–97)
MONOCYTES # BLD AUTO: 0.54 10*3/MM3 (ref 0.1–0.9)
MONOCYTES NFR BLD AUTO: 9.7 % (ref 5–12)
NEUTROPHILS NFR BLD AUTO: 3.37 10*3/MM3 (ref 1.7–7)
NEUTROPHILS NFR BLD AUTO: 60.7 % (ref 42.7–76)
NRBC BLD AUTO-RTO: 0 /100 WBC (ref 0–0.2)
PLATELET # BLD AUTO: 195 10*3/MM3 (ref 140–450)
PMV BLD AUTO: 11.2 FL (ref 6–12)
POTASSIUM SERPL-SCNC: 4.1 MMOL/L (ref 3.5–5.2)
RBC # BLD AUTO: 4.64 10*6/MM3 (ref 4.14–5.8)
SODIUM SERPL-SCNC: 142 MMOL/L (ref 136–145)
WBC NRBC COR # BLD AUTO: 5.55 10*3/MM3 (ref 3.4–10.8)

## 2024-11-22 PROCEDURE — 85025 COMPLETE CBC W/AUTO DIFF WBC: CPT

## 2024-11-22 PROCEDURE — 80048 BASIC METABOLIC PNL TOTAL CA: CPT

## 2024-11-22 PROCEDURE — 36415 COLL VENOUS BLD VENIPUNCTURE: CPT

## 2024-11-25 ENCOUNTER — HOSPITAL ENCOUNTER (OUTPATIENT)
Dept: CARDIOLOGY | Facility: HOSPITAL | Age: 69
Setting detail: HOSPITAL OUTPATIENT SURGERY
Discharge: HOME | End: 2024-11-25
Admitting: INTERNAL MEDICINE
Payer: MEDICARE

## 2024-11-25 ENCOUNTER — HOSPITAL ENCOUNTER (OUTPATIENT)
Facility: HOSPITAL | Age: 69
Setting detail: HOSPITAL OUTPATIENT SURGERY
Discharge: HOME | End: 2024-11-25
Attending: INTERNAL MEDICINE | Admitting: INTERNAL MEDICINE
Payer: MEDICARE

## 2024-11-25 ENCOUNTER — ANESTHESIA EVENT (OUTPATIENT)
Dept: CARDIOLOGY | Facility: HOSPITAL | Age: 69
End: 2024-11-25
Payer: MEDICARE

## 2024-11-25 ENCOUNTER — ANESTHESIA (OUTPATIENT)
Dept: CARDIOLOGY | Facility: HOSPITAL | Age: 69
End: 2024-11-25
Payer: MEDICARE

## 2024-11-25 VITALS
HEART RATE: 52 BPM | WEIGHT: 199.08 LBS | OXYGEN SATURATION: 97 % | BODY MASS INDEX: 31.25 KG/M2 | DIASTOLIC BLOOD PRESSURE: 71 MMHG | SYSTOLIC BLOOD PRESSURE: 114 MMHG | RESPIRATION RATE: 10 BRPM | TEMPERATURE: 97.5 F | HEIGHT: 67 IN

## 2024-11-25 VITALS
TEMPERATURE: 97.9 F | HEART RATE: 59 BPM | DIASTOLIC BLOOD PRESSURE: 58 MMHG | SYSTOLIC BLOOD PRESSURE: 117 MMHG | OXYGEN SATURATION: 100 %

## 2024-11-25 VITALS — DIASTOLIC BLOOD PRESSURE: 73 MMHG | HEART RATE: 55 BPM | SYSTOLIC BLOOD PRESSURE: 129 MMHG | OXYGEN SATURATION: 98 %

## 2024-11-25 DIAGNOSIS — R93.1 ABNORMAL NUCLEAR CARDIAC IMAGING TEST: ICD-10-CM

## 2024-11-25 DIAGNOSIS — I20.89 ANGINA AT REST: ICD-10-CM

## 2024-11-25 DIAGNOSIS — I34.0 NONRHEUMATIC MITRAL VALVE REGURGITATION: ICD-10-CM

## 2024-11-25 PROCEDURE — 93454 CORONARY ARTERY ANGIO S&I: CPT | Performed by: INTERNAL MEDICINE

## 2024-11-25 PROCEDURE — 25010000002 FENTANYL CITRATE (PF) 100 MCG/2ML SOLUTION: Performed by: INTERNAL MEDICINE

## 2024-11-25 PROCEDURE — 93320 DOPPLER ECHO COMPLETE: CPT

## 2024-11-25 PROCEDURE — 25810000003 SODIUM CHLORIDE 0.9 % SOLUTION: Performed by: NURSE ANESTHETIST, CERTIFIED REGISTERED

## 2024-11-25 PROCEDURE — 25010000002 GLYCOPYRROLATE 0.2 MG/ML SOLUTION: Performed by: NURSE ANESTHETIST, CERTIFIED REGISTERED

## 2024-11-25 PROCEDURE — 25010000002 MIDAZOLAM PER 1 MG: Performed by: INTERNAL MEDICINE

## 2024-11-25 PROCEDURE — C1769 GUIDE WIRE: HCPCS | Performed by: INTERNAL MEDICINE

## 2024-11-25 PROCEDURE — 25010000002 PROPOFOL 10 MG/ML EMULSION: Performed by: NURSE ANESTHETIST, CERTIFIED REGISTERED

## 2024-11-25 PROCEDURE — C1894 INTRO/SHEATH, NON-LASER: HCPCS | Performed by: INTERNAL MEDICINE

## 2024-11-25 PROCEDURE — 93325 DOPPLER ECHO COLOR FLOW MAPG: CPT

## 2024-11-25 PROCEDURE — 93321 DOPPLER ECHO F-UP/LMTD STD: CPT

## 2024-11-25 PROCEDURE — 25010000002 LIDOCAINE PF 1% 1 % SOLUTION: Performed by: NURSE ANESTHETIST, CERTIFIED REGISTERED

## 2024-11-25 PROCEDURE — 25010000002 LIDOCAINE 2% SOLUTION: Performed by: INTERNAL MEDICINE

## 2024-11-25 PROCEDURE — 25510000001 IOPAMIDOL PER 1 ML: Performed by: INTERNAL MEDICINE

## 2024-11-25 RX ORDER — EPHEDRINE SULFATE 5 MG/ML
5 INJECTION INTRAVENOUS ONCE AS NEEDED
OUTPATIENT
Start: 2024-11-25

## 2024-11-25 RX ORDER — ACETAMINOPHEN 325 MG/1
650 TABLET ORAL EVERY 4 HOURS PRN
Status: DISCONTINUED | OUTPATIENT
Start: 2024-11-25 | End: 2024-11-25 | Stop reason: HOSPADM

## 2024-11-25 RX ORDER — DIPHENHYDRAMINE HYDROCHLORIDE 50 MG/ML
12.5 INJECTION INTRAMUSCULAR; INTRAVENOUS
OUTPATIENT
Start: 2024-11-25

## 2024-11-25 RX ORDER — LIDOCAINE HYDROCHLORIDE 20 MG/ML
INJECTION, SOLUTION INFILTRATION; PERINEURAL
Status: DISCONTINUED | OUTPATIENT
Start: 2024-11-25 | End: 2024-11-25 | Stop reason: HOSPADM

## 2024-11-25 RX ORDER — PROPOFOL 10 MG/ML
VIAL (ML) INTRAVENOUS AS NEEDED
Status: DISCONTINUED | OUTPATIENT
Start: 2024-11-25 | End: 2024-11-25 | Stop reason: SURG

## 2024-11-25 RX ORDER — SODIUM CHLORIDE 9 MG/ML
INJECTION, SOLUTION INTRAVENOUS CONTINUOUS PRN
Status: DISCONTINUED | OUTPATIENT
Start: 2024-11-25 | End: 2024-11-25 | Stop reason: SURG

## 2024-11-25 RX ORDER — FENTANYL CITRATE 50 UG/ML
INJECTION, SOLUTION INTRAMUSCULAR; INTRAVENOUS
Status: DISCONTINUED | OUTPATIENT
Start: 2024-11-25 | End: 2024-11-25 | Stop reason: HOSPADM

## 2024-11-25 RX ORDER — NITROGLYCERIN 0.4 MG/1
0.4 TABLET SUBLINGUAL
Status: DISCONTINUED | OUTPATIENT
Start: 2024-11-25 | End: 2024-11-25 | Stop reason: HOSPADM

## 2024-11-25 RX ORDER — IPRATROPIUM BROMIDE AND ALBUTEROL SULFATE 2.5; .5 MG/3ML; MG/3ML
3 SOLUTION RESPIRATORY (INHALATION) ONCE AS NEEDED
OUTPATIENT
Start: 2024-11-25

## 2024-11-25 RX ORDER — GLYCOPYRROLATE 0.2 MG/ML
INJECTION INTRAMUSCULAR; INTRAVENOUS AS NEEDED
Status: DISCONTINUED | OUTPATIENT
Start: 2024-11-25 | End: 2024-11-25 | Stop reason: SURG

## 2024-11-25 RX ORDER — MIDAZOLAM HYDROCHLORIDE 1 MG/ML
INJECTION, SOLUTION INTRAMUSCULAR; INTRAVENOUS
Status: DISCONTINUED | OUTPATIENT
Start: 2024-11-25 | End: 2024-11-25 | Stop reason: HOSPADM

## 2024-11-25 RX ORDER — LABETALOL HYDROCHLORIDE 5 MG/ML
5 INJECTION, SOLUTION INTRAVENOUS
OUTPATIENT
Start: 2024-11-25

## 2024-11-25 RX ORDER — ONDANSETRON 2 MG/ML
4 INJECTION INTRAMUSCULAR; INTRAVENOUS ONCE AS NEEDED
OUTPATIENT
Start: 2024-11-25

## 2024-11-25 RX ORDER — MEPERIDINE HYDROCHLORIDE 25 MG/ML
12.5 INJECTION INTRAMUSCULAR; INTRAVENOUS; SUBCUTANEOUS
OUTPATIENT
Start: 2024-11-25 | End: 2024-11-26

## 2024-11-25 RX ORDER — HYDRALAZINE HYDROCHLORIDE 20 MG/ML
5 INJECTION INTRAMUSCULAR; INTRAVENOUS
OUTPATIENT
Start: 2024-11-25

## 2024-11-25 RX ORDER — IOPAMIDOL 755 MG/ML
INJECTION, SOLUTION INTRAVASCULAR
Status: DISCONTINUED | OUTPATIENT
Start: 2024-11-25 | End: 2024-11-25 | Stop reason: HOSPADM

## 2024-11-25 RX ORDER — EPHEDRINE SULFATE 5 MG/ML
INJECTION INTRAVENOUS AS NEEDED
Status: DISCONTINUED | OUTPATIENT
Start: 2024-11-25 | End: 2024-11-25 | Stop reason: SURG

## 2024-11-25 RX ORDER — LIDOCAINE HYDROCHLORIDE 10 MG/ML
INJECTION, SOLUTION EPIDURAL; INFILTRATION; INTRACAUDAL; PERINEURAL AS NEEDED
Status: DISCONTINUED | OUTPATIENT
Start: 2024-11-25 | End: 2024-11-25 | Stop reason: SURG

## 2024-11-25 RX ADMIN — PROPOFOL 40 MG: 10 INJECTION, EMULSION INTRAVENOUS at 09:01

## 2024-11-25 RX ADMIN — PROPOFOL 20 MG: 10 INJECTION, EMULSION INTRAVENOUS at 08:59

## 2024-11-25 RX ADMIN — SODIUM CHLORIDE: 9 INJECTION, SOLUTION INTRAVENOUS at 08:47

## 2024-11-25 RX ADMIN — GLYCOPYRROLATE 0.2 MG: 0.2 INJECTION, SOLUTION INTRAMUSCULAR; INTRAVENOUS at 08:52

## 2024-11-25 RX ADMIN — LIDOCAINE HYDROCHLORIDE 80 MG: 10 INJECTION, SOLUTION EPIDURAL; INFILTRATION; INTRACAUDAL; PERINEURAL at 08:53

## 2024-11-25 RX ADMIN — EPHEDRINE SULFATE 10 MG: 5 INJECTION INTRAVENOUS at 09:08

## 2024-11-25 RX ADMIN — EPHEDRINE SULFATE 10 MG: 5 INJECTION INTRAVENOUS at 09:00

## 2024-11-25 RX ADMIN — PROPOFOL 20 MG: 10 INJECTION, EMULSION INTRAVENOUS at 08:56

## 2024-11-25 RX ADMIN — PROPOFOL 40 MG: 10 INJECTION, EMULSION INTRAVENOUS at 08:54

## 2024-11-25 RX ADMIN — PROPOFOL 80 MG: 10 INJECTION, EMULSION INTRAVENOUS at 08:53

## 2024-11-25 NOTE — H&P
Patient Care Team:  Lia Grove APRN as PCP - General (Nurse Practitioner)  Celso Nichole MD as Consulting Physician (Cardiology)  Dahiana Brizuela MD as Consulting Physician (Gastroenterology)  Jennifer Koehler MD as Consulting Physician (Hematology and Oncology)    Chief complaint chest pain or shortness of breath    Subjective     History of Present Illness 68-year-old with chest pain and shortness of breath and mitral regurgitation    Review of Systems   Respiratory:  Positive for shortness of breath.    Cardiovascular:  Positive for chest pain.          Past Medical History:   Diagnosis Date    CPAP (continuous positive airway pressure) dependence     Enlarged prostate     GERD (gastroesophageal reflux disease)     Hypertension     Pulmonary embolism 04/2022    lungs       Objective      Vital Signs  Temp:  [97.5 °F (36.4 °C)] 97.5 °F (36.4 °C)  Heart Rate:  [60-64] 60  Resp:  [15] 15  BP: (127)/(70) 127/70    Physical Exam  HENT:      Head: Normocephalic and atraumatic.   Eyes:      Pupils: Pupils are equal, round, and reactive to light.   Cardiovascular:      Rate and Rhythm: Normal rate and regular rhythm.      Pulses: Normal pulses.   Pulmonary:      Effort: Pulmonary effort is normal.   Abdominal:      Palpations: Abdomen is soft.   Skin:     General: Skin is warm.   Neurological:      General: No focal deficit present.      Mental Status: He is alert.         Results Review:    I reviewed the patient's new clinical results.      Assessment & Plan       Angina at rest    Abnormal nuclear cardiac imaging test    Nonrheumatic mitral valve regurgitation      Assessment & Plan plan for DEREK and cath    I discussed the patients findings and my recommendations with patient    Celso Nichole MD  11/25/24  09:09 EST

## 2024-11-25 NOTE — ANESTHESIA POSTPROCEDURE EVALUATION
Patient: Manuel Rolle    Procedure Summary       Date: 11/25/24 Room / Location: [facility]    Anesthesia Start: 0851 Anesthesia Stop: 0914    Procedure: ADULT TRANSESOPHAGEAL ECHO (DEREK) W/ CONT IF NECESSARY PER PROTOCOL Diagnosis:       Angina at rest      Abnormal nuclear cardiac imaging test      Nonrheumatic mitral valve regurgitation      (Valvular Disease)    Scheduled Providers: Celso Nichole MD; Hawk Henderson MD Provider: Hawk Henderson MD    Anesthesia Type: MAC ASA Status: 3            Anesthesia Type: MAC    Vitals  Vitals Value Taken Time   /87 11/25/24 1000   Temp 97.9 °F (36.6 °C) 11/25/24 0900   Pulse 54 11/25/24 1000   Resp 14 11/25/24 1000   SpO2 100 % 11/25/24 1000           Post Anesthesia Care and Evaluation    Patient location during evaluation: PACU  Patient participation: complete - patient participated  Level of consciousness: awake  Pain scale: See nurse's notes for pain score.  Pain management: adequate    Airway patency: patent  Anesthetic complications: No anesthetic complications  PONV Status: none  Cardiovascular status: acceptable  Respiratory status: acceptable and spontaneous ventilation  Hydration status: acceptable    Comments: Patient seen and examined postoperatively; vital signs stable; SpO2 greater than or equal to 90%; cardiopulmonary status stable; nausea/vomiting adequately controlled; pain adequately controlled; no apparent anesthesia complications; patient discharged from anesthesia care when discharge criteria were met

## 2024-11-25 NOTE — ADDENDUM NOTE
Addendum  created 11/25/24 1022 by Hawk Henderson MD    Attestation recorded in Intraprocedure, Clinical Note Signed, Intraprocedure Attestations filed, Intraprocedure Event edited

## 2024-11-25 NOTE — DISCHARGE INSTRUCTIONS
DEREK DC Instructions  The medication, which was used to put you to sleep, will be acting in your body for the next twenty-four (24) hours, so you might feel a little sleepy; this feeling will slowly wear off.  Because the medicine is still in your system for the next twenty-four (24) hours:  Do not drive a car, operate machinery, or power tools  Do not drink any alcoholic drinks (not even beer)  Make any important decisions such as to sign important papers    We strongly suggest that a responsible adult be with the patient the rest of the day.    What to do for pain: acetaminophen (Tylenol) and eating cool, soothing foods (e.g. ice cream, smoothies) can help with a sore throat. If your pain is unmanageable with these methods, call the Cardiologist's office.     It may be better to start with liquids such as water, then soups, and graduate up to solid foods  If medication was used to numb your throat, do not eat or drink anything for 2 hours.     Call the cardiologist with any problems of:  Excessive mucus  Spitting up blood  Sore throat more than 72 hours    Go to the nearest Emergency Department if you're vomiting blood or having difficulty breathing.         Post Cath Instructions  Drink plenty of water for the next 24 hours. This helps to eliminate the dye used in your procedure through urination.  You may resume a normal diet; however, try to avoid foods that would cause gas or constipation.    What to do for pain: acetaminophen (Tylenol), not ibuprofen (Advil) can be used for puncture site tenderness. If your pain is unmanageable with this method, call the Cardiologist's office.     Sedative medication (Anesthesia) given to you during your catheterization may decrease your judgement and reaction time for up to 24-48 hours.  Therefore:  DO NOT drive, operate hazardous machinery, or consume alcoholic beverages for 24 hours.   DO NOT make any important/legal decisions for 24 hours.   Have someone stay with you for  at least 24 hours.    For the next 48 hours (to allow proper healing and prevent bleeding):  Avoid excessive bending at wound site  Avoid straining (anything that would tense up muscles around the affected puncture site)  Avoid lifting, pushing, or pulling objects greater than 5 pounds, for 5 days  For Groin Cases:  Refrain from running, vigorous walking, and sexual activity  No prolonged sitting or standing   Limit stair climbing as much as possible    Keep the puncture site clean and dry.  After 24 hours, remove the dressing and replace it with a Band-Aid for at least one additional day.  Gently clean the site with mild soap and water.  No scrubbing/rubbing, and lightly pat the area dry.  Showers are acceptable; however, avoid submerging in water (tub baths, hot tubs, pools, dishwater, etc…) for at least one week.  The site should be completely healed before resuming these activities to reduce the risk of infection. Watch for signs and symptoms of infection and notify the physician of any of the following:  Bleeding or an increase in swelling, redness, or warmth at the puncture site  Fever  Increased soreness around puncture site  Foul odor or significant drainage from the puncture site  **A bruise or small “pea sized” lump under the skin at the puncture site is not unusual.  This should disappear within 3-4 weeks.**    CONTACT YOUR PHYSICIAN OR CALL 911 IF YOU EXPERIENCE ANY OF THE FOLLOWING:  Increased angina (chest pain) or frequent sensations of pressure, burning, pain, or other discomfort in the chest, arm, jaws, or stomach  Lightheadedness, dizziness, faint feeling, sweating, or difficulty breathing  Odd changes in sedation (like numbness, tingling, coldness, or pain) or color (pale/bluish) in the arm or leg in which the catheter was inserted.    IMPORTANT:  Although this occurs very rarely, if you should develop bright red or excessive bleeding, feel a “pop” inside at the insertion site, or notice a sudden  increase in swelling larger than a walnut, you should call 911.  Hold continuous firm pressure to the access site until emergency personnel arrive.  It is best if someone else can do this for you.

## 2024-11-25 NOTE — ANESTHESIA PREPROCEDURE EVALUATION
Anesthesia Evaluation     NPO Solid Status: > 8 hours  NPO Liquid Status: > 8 hours           Airway   Mallampati: II  TM distance: >3 FB  Neck ROM: full  No difficulty expected  Dental - normal exam     Pulmonary - normal exam   (+) pulmonary embolism,  Cardiovascular - normal exam    (+) hypertension well controlled, valvular problems/murmurs, CAD, angina      Neuro/Psych  (+) psychiatric history  GI/Hepatic/Renal/Endo    (+) GERD    Musculoskeletal     Abdominal  - normal exam    Bowel sounds: normal.   Substance History      OB/GYN          Other                    Anesthesia Plan    ASA 3     MAC   total IV anesthesia  intravenous induction     Anesthetic plan, risks, benefits, and alternatives have been provided, discussed and informed consent has been obtained with: patient.  Pre-procedure education provided  Plan discussed with CRNA.    CODE STATUS:

## 2024-11-26 ENCOUNTER — TELEPHONE (OUTPATIENT)
Dept: CARDIOLOGY | Facility: CLINIC | Age: 69
End: 2024-11-26
Payer: MEDICARE

## 2024-11-26 DIAGNOSIS — I25.10 CORONARY ARTERY DISEASE INVOLVING NATIVE CORONARY ARTERY OF NATIVE HEART WITHOUT ANGINA PECTORIS: ICD-10-CM

## 2024-11-26 DIAGNOSIS — I10 PRIMARY HYPERTENSION: Primary | ICD-10-CM

## 2024-11-26 LAB
BH CV ECHO MEAS - MR MAX PG: 42 MMHG
BH CV ECHO MEAS - MR MAX VEL: 324 CM/SEC
BH CV ECHO SHUNT ASSESSMENT PERFORMED (HIDDEN SCRIPTING): 1

## 2024-11-26 RX ORDER — AMLODIPINE BESYLATE 5 MG/1
5 TABLET ORAL DAILY
Qty: 90 TABLET | Refills: 3 | Status: SHIPPED | OUTPATIENT
Start: 2024-11-26

## 2024-11-26 NOTE — TELEPHONE ENCOUNTER
Patient called and said he just had a heart cath and Dr Nichole didn't send him in any medications for his leaky valve and he still is having shortness of breath . He wants someone to check to see if he can send in his medication he needs.

## 2024-11-26 NOTE — TELEPHONE ENCOUNTER
Called pt. his blood pressure readings at home are still running high.  I have told him that I will send in a prescription for amlodipine 5 mg.  Please send a prescription and he is going to check his blood pressure and see me in 3 weeks.

## 2024-11-27 ENCOUNTER — TELEPHONE (OUTPATIENT)
Dept: ONCOLOGY | Facility: CLINIC | Age: 69
End: 2024-11-27

## 2024-11-27 NOTE — TELEPHONE ENCOUNTER
RECEIVED A PHONE CALL FROM THE PATIENT REGARDING CURRENT SYMPTOMS HE IS EXPERIENCING. PATIENT STATED HE HAS BEEN EXPERIENCING SOA AND SEVERE FATIGUE OVER THE LAST 6-8 MONTHS THEREFORE HIS CARDIOLOGIST RECENTLY DID A WORK-UP ON HIM TO CHECK IF HIS SYMPTOMS WERE BEING CAUSED BY HIS HEART. HE STATED THAT HIS CARDIOLOGIST DID STATE THAT HE HAS A CARDIAC ISSUE BUT IT IS MANAGEABLE WITH MEDICATION. HE STATED THAT HIS DAUGHTER REVIEWED HIS LAB WORK AND ADVISED FOR HIM TO SEE DR. LIN TO RUN MORE TEST TO VERIFY WHY HIS HGB IS LOW. HE ASKED ABOUT STARTING AN IRON SUPPLEMENT AND IF DR. LIN CAN CHECK THOSE LEVELS. I INFORMED HIM THAT WE WILL SPEAK WITH HER ON MONDAY WHEN SHE RETURNS TO THE OFFICE THEN WE WILL CONTACT HIM BACK. HE CONFIRMED.

## 2024-11-27 NOTE — TELEPHONE ENCOUNTER
Caller: Sary Rolle    Relationship to patient: Emergency Contact    Best call back number: [phone]    Chief complaint: [facility]  WOULD LIKE HIM TO FOLLOW UP WITH DR LIN , [facility] HAD RECENT LAB WORK LAST WEEK DONE AT [facility] AND PCP WOULD LIKE [facility] TO FOLLOW UP WITH DR LIN ON THOSE     Type of visit: FOLLOW UP     Requested date: ANYTIME IN THE MONTH OF DECEMBER

## 2024-12-17 ENCOUNTER — OFFICE VISIT (OUTPATIENT)
Dept: CARDIOLOGY | Facility: CLINIC | Age: 69
End: 2024-12-17
Payer: MEDICARE

## 2024-12-17 VITALS
OXYGEN SATURATION: 97 % | DIASTOLIC BLOOD PRESSURE: 60 MMHG | HEART RATE: 52 BPM | WEIGHT: 203.5 LBS | HEIGHT: 67 IN | SYSTOLIC BLOOD PRESSURE: 104 MMHG | BODY MASS INDEX: 31.94 KG/M2

## 2024-12-17 DIAGNOSIS — E78.00 PURE HYPERCHOLESTEROLEMIA: ICD-10-CM

## 2024-12-17 DIAGNOSIS — G47.33 OBSTRUCTIVE SLEEP APNEA: ICD-10-CM

## 2024-12-17 DIAGNOSIS — I34.0 NONRHEUMATIC MITRAL VALVE REGURGITATION: ICD-10-CM

## 2024-12-17 DIAGNOSIS — I25.10 CORONARY ARTERY DISEASE INVOLVING NATIVE CORONARY ARTERY OF NATIVE HEART WITHOUT ANGINA PECTORIS: ICD-10-CM

## 2024-12-17 DIAGNOSIS — I10 PRIMARY HYPERTENSION: Primary | ICD-10-CM

## 2024-12-17 RX ORDER — ATORVASTATIN CALCIUM 20 MG/1
20 TABLET, FILM COATED ORAL DAILY
COMMUNITY
Start: 2024-12-13 | End: 2025-12-13

## 2024-12-17 NOTE — PROGRESS NOTES
Subjective:     Encounter Date:12/17/2024      Patient ID: Manuel Rolle is a 69 y.o. male.    Chief Complaint:  History of Present Illness 69-year-old white male with history of coronary disease history of mitral valve disease hypertension hyperlipidemia presents to my office for a follow-up.  Patient is currently stable without any symptoms of chest pain or shortness of breath at rest or exertion.  No complaint of any PND orthopnea.  No palpitations dizziness syncope or swelling of the feet.  Patient is taking all the medicines regular.  Patient does not smoke.    The following portions of the patient's history were reviewed and updated as appropriate: allergies, current medications, past family history, past medical history, past social history, past surgical history, and problem list.  Past Medical History:   Diagnosis Date    CPAP (continuous positive airway pressure) dependence     Enlarged prostate     GERD (gastroesophageal reflux disease)     Hypertension     Pulmonary embolism 04/2022    lungs     Past Surgical History:   Procedure Laterality Date    ANKLE SURGERY      CARDIAC CATHETERIZATION      CARDIAC CATHETERIZATION N/A 11/25/2024    Procedure: Left Heart Cath with angiogram;  Surgeon: Celso Nichole MD;  Location: [facility] CATH INVASIVE LOCATION;  Service: Cardiovascular;  Laterality: N/A;    CARDIAC CATHETERIZATION Right 11/25/2024    Procedure: Coronary angiography;  Surgeon: Celso Nichole MD;  Location: [facility] CATH INVASIVE LOCATION;  Service: Cardiovascular;  Laterality: Right;    CATARACT EXTRACTION Bilateral     COLONOSCOPY  2019    COLONOSCOPY N/A 06/22/2022    Procedure: COLONOSCOPY with polypectomy x5;  Surgeon: Dahiana Brizuela MD;  Location: [facility] ENDOSCOPY;  Service: Gastroenterology;  Laterality: N/A;  post: colon polyps, diverticulosis, hemorrhoids    ENDOSCOPY N/A 06/22/2022    Procedure: ESOPHAGOGASTRODUODENOSCOPY with biopsy x1 area;  Surgeon: Dahiana Brizuela MD;  Location: [facility]  "ENDOSCOPY;  Service: Gastroenterology;  Laterality: N/A;  post: hiatal hernia, gastritis    HERNIA REPAIR       /60   Pulse 52   Ht 170.2 cm (67\")   Wt 92.3 kg (203 lb 8 oz)   SpO2 97%   BMI 31.87 kg/m²   Family History   Problem Relation Age of Onset    Heart disease Mother     Hypertension Father        Current Outpatient Medications:     amLODIPine (NORVASC) 5 MG tablet, Take 1 tablet by mouth Daily., Disp: 90 tablet, Rfl: 3    apixaban (ELIQUIS) 2.5 MG tablet tablet, Take 1 tablet by mouth 2 (Two) Times a Day., Disp: 180 tablet, Rfl: 4    Apoaequorin (PREVAGEN PO), Take 1 capsule by mouth Daily., Disp: , Rfl:     aspirin 81 MG EC tablet, Take 1 tablet by mouth Daily. Hold 2 days prior to sx 6/22/22, Disp: , Rfl:     atorvastatin (LIPITOR) 20 MG tablet, Take 1 tablet by mouth Daily., Disp: , Rfl:     benazepril (LOTENSIN) 20 MG tablet, Take 1 tablet by mouth Daily., Disp: , Rfl:     escitalopram (LEXAPRO) 10 MG tablet, Take 1 tablet by mouth Daily., Disp: , Rfl:     furosemide (LASIX) 20 MG tablet, Take 1 tablet by mouth Daily., Disp: , Rfl:     glucosamine sulfate 500 MG capsule capsule, Take 2 capsules by mouth Every Morning., Disp: , Rfl:     metoprolol succinate XL (TOPROL-XL) 50 MG 24 hr tablet, Take 0.5 tablets by mouth Daily., Disp: 90 tablet, Rfl: 0    omeprazole (priLOSEC) 40 MG capsule, Take 1 capsule by mouth Daily., Disp: , Rfl:     pantoprazole (PROTONIX) 40 MG EC tablet, Take 1 tablet by mouth Daily., Disp: , Rfl:     tamsulosin (FLOMAX) 0.4 MG capsule 24 hr capsule, Take 1 capsule by mouth Daily., Disp: , Rfl:     temazepam (RESTORIL) 30 MG capsule, Take 1 capsule by mouth At Night As Needed., Disp: , Rfl:   No Known Allergies  Social History     Socioeconomic History    Marital status:    Tobacco Use    Smoking status: Former    Smokeless tobacco: Never    Tobacco comments:     Quit approximately 1995   Vaping Use    Vaping status: Never Used   Substance and Sexual Activity    " Alcohol use: Yes     Comment: 1-2 a week    Drug use: Not Currently    Sexual activity: Defer     Review of Systems   Constitutional: Negative for malaise/fatigue.   Cardiovascular:  Negative for chest pain, dyspnea on exertion, leg swelling and palpitations.   Respiratory:  Negative for cough and shortness of breath.    Gastrointestinal:  Negative for abdominal pain, nausea and vomiting.   Neurological:  Negative for dizziness, focal weakness, headaches, light-headedness and numbness.   All other systems reviewed and are negative.             Objective:     Constitutional:       Appearance: Well-developed.   Eyes:      General: No scleral icterus.     Conjunctiva/sclera: Conjunctivae normal.   HENT:      Head: Normocephalic and atraumatic.   Neck:      Vascular: No carotid bruit or JVD.   Pulmonary:      Effort: Pulmonary effort is normal.      Breath sounds: Normal breath sounds. No wheezing. No rales.   Cardiovascular:      Normal rate. Regular rhythm.   Pulses:     Intact distal pulses.   Abdominal:      General: Bowel sounds are normal.      Palpations: Abdomen is soft.   Musculoskeletal:      Cervical back: Normal range of motion and neck supple. Skin:     General: Skin is warm and dry.      Findings: No rash.   Neurological:      Mental Status: Alert.       Procedures    Lab Review:         MDM    #1 coronary artery disease  Patient has nonobstructive disease in the past and is currently stable without any angina  2.  Mitral regurgitation  Patient has moderate mitral regurgitation with normal LV systolic function is stable on medical therapy including amlodipine metoprolol and benazepril  3.  Hypertension  Patient blood pressure currently stable on medical therapy  4.  Hyperlipidemia  Patient on atorvastatin and the lipid levels are well within normal limits  5.  Sleep apnea  Patient has sleep apnea and uses a CPAP machine    Patient's previous medical records, labs, and EKG were reviewed and discussed with  the patient at today's visit.

## 2025-03-24 RX ORDER — METOPROLOL SUCCINATE 50 MG/1
25 TABLET, EXTENDED RELEASE ORAL DAILY
Qty: 45 TABLET | Refills: 1 | Status: SHIPPED | OUTPATIENT
Start: 2025-03-24

## 2025-03-24 NOTE — TELEPHONE ENCOUNTER
Rx Refill Note  Requested Prescriptions     Pending Prescriptions Disp Refills    metoprolol succinate XL (TOPROL-XL) 50 MG 24 hr tablet [Pharmacy Med Name: METOPROLOL SUCC ER 50 MG TAB] 45 tablet 1     Sig: TAKE 1/2 TABLET BY MOUTH DAILY      Last office visit with prescribing clinician: 12/17/2024   Last telemedicine visit with prescribing clinician: Visit date not found   Next office visit with prescribing clinician: 6/30/2025                         Would you like a call back once the refill request has been completed: [] Yes [] No    If the office needs to give you a call back, can they leave a voicemail: [] Yes [] No    Ladonna Torrez MA  03/24/25, 08:07 EDT

## 2025-06-02 ENCOUNTER — TELEPHONE (OUTPATIENT)
Dept: ONCOLOGY | Facility: CLINIC | Age: 70
End: 2025-06-02
Payer: MEDICARE

## 2025-06-02 NOTE — TELEPHONE ENCOUNTER
Caller: Manuel Rolle    Relationship: Self    Best call back number:   Telephone Information:   Mobile [phone]       What is the best time to reach you: ANYTIME    What was the call regarding: PT NEEDS TO R/S 6/9 APPT WOULD LIKE FIRST AVAIL IN JULY. PLEASE CB TO ADVISE

## 2025-06-24 ENCOUNTER — OFFICE VISIT (OUTPATIENT)
Dept: ORTHOPEDIC SURGERY | Facility: CLINIC | Age: 70
End: 2025-06-24
Payer: MEDICARE

## 2025-06-24 ENCOUNTER — OFFICE (OUTPATIENT)
Dept: URBAN - METROPOLITAN AREA CLINIC 64 | Facility: CLINIC | Age: 70
End: 2025-06-24
Payer: COMMERCIAL

## 2025-06-24 ENCOUNTER — PREP FOR SURGERY (OUTPATIENT)
Dept: OTHER | Facility: HOSPITAL | Age: 70
End: 2025-06-24
Payer: MEDICARE

## 2025-06-24 VITALS — WEIGHT: 209.3 LBS | BODY MASS INDEX: 31.72 KG/M2 | TEMPERATURE: 98.2 F | HEIGHT: 68 IN

## 2025-06-24 VITALS
HEART RATE: 56 BPM | DIASTOLIC BLOOD PRESSURE: 68 MMHG | WEIGHT: 208 LBS | SYSTOLIC BLOOD PRESSURE: 108 MMHG | HEIGHT: 68 IN

## 2025-06-24 DIAGNOSIS — D50.0 IRON DEFICIENCY ANEMIA SECONDARY TO BLOOD LOSS (CHRONIC): ICD-10-CM

## 2025-06-24 DIAGNOSIS — M17.0 PRIMARY OSTEOARTHRITIS OF BOTH KNEES: ICD-10-CM

## 2025-06-24 DIAGNOSIS — R19.4 CHANGE IN BOWEL HABIT: ICD-10-CM

## 2025-06-24 DIAGNOSIS — R52 PAIN: Primary | ICD-10-CM

## 2025-06-24 DIAGNOSIS — M17.11 PRIMARY OSTEOARTHRITIS OF RIGHT KNEE: Primary | ICD-10-CM

## 2025-06-24 PROCEDURE — 99214 OFFICE O/P EST MOD 30 MIN: CPT | Performed by: INTERNAL MEDICINE

## 2025-06-24 PROCEDURE — 73562 X-RAY EXAM OF KNEE 3: CPT | Performed by: NURSE PRACTITIONER

## 2025-06-24 PROCEDURE — 1159F MED LIST DOCD IN RCRD: CPT | Performed by: NURSE PRACTITIONER

## 2025-06-24 PROCEDURE — 1160F RVW MEDS BY RX/DR IN RCRD: CPT | Performed by: NURSE PRACTITIONER

## 2025-06-24 PROCEDURE — 99204 OFFICE O/P NEW MOD 45 MIN: CPT | Performed by: NURSE PRACTITIONER

## 2025-06-24 RX ORDER — CYCLOBENZAPRINE HCL 10 MG
TABLET ORAL
COMMUNITY
Start: 2025-06-23

## 2025-06-24 RX ORDER — APIXABAN 5 MG/1
5 TABLET, FILM COATED ORAL DAILY
COMMUNITY
Start: 2025-05-19

## 2025-06-24 RX ORDER — BENAZEPRIL HYDROCHLORIDE 20 MG/1
20 TABLET ORAL DAILY
COMMUNITY
Start: 2025-05-01

## 2025-06-24 RX ORDER — ESCITALOPRAM OXALATE 10 MG/1
10 TABLET ORAL DAILY
COMMUNITY
Start: 2025-01-29

## 2025-06-24 RX ORDER — PREGABALIN 75 MG/1
150 CAPSULE ORAL ONCE
OUTPATIENT
Start: 2025-06-24 | End: 2025-06-24

## 2025-06-24 RX ORDER — TAMSULOSIN HYDROCHLORIDE 0.4 MG/1
0.4 CAPSULE ORAL DAILY
COMMUNITY
Start: 2025-03-26

## 2025-06-24 RX ORDER — TRAMADOL HYDROCHLORIDE 50 MG/1
50 TABLET ORAL
COMMUNITY
Start: 2025-06-17 | End: 2025-07-17

## 2025-06-24 RX ORDER — VANCOMYCIN/0.9 % SOD CHLORIDE 1.5G/250ML
15 PLASTIC BAG, INJECTION (ML) INTRAVENOUS ONCE
OUTPATIENT
Start: 2025-06-24 | End: 2025-06-24

## 2025-06-24 RX ORDER — CHLORHEXIDINE GLUCONATE 500 MG/1
CLOTH TOPICAL 2 TIMES DAILY
OUTPATIENT
Start: 2025-06-24

## 2025-06-24 NOTE — PROGRESS NOTES
Patient: Manuel Rolle  YOB: 1955 69 y.o. male  Medical Record Number: [id number]    Chief Complaints:   Chief Complaint   Patient presents with    Left Knee - Initial Evaluation    Right Knee - Initial Evaluation       History of Present Illness:Manuel Rolle is a 69 y.o. male who presents as a new patient to myself as well as to the practice with complaints of bilateral knee pain right greater than left.  The patient reports he has had pain for many years, unfortunately it has progressively gotten worse, he has tried and failed conservative measures including injections, medications, exercises, he would like to proceed with surgery    Allergies: No Known Allergies    Medications:   Current Outpatient Medications   Medication Sig Dispense Refill    amLODIPine (NORVASC) 5 MG tablet Take 1 tablet by mouth Daily. 90 tablet 3    apixaban (ELIQUIS) 2.5 MG tablet tablet Take 1 tablet by mouth 2 (Two) Times a Day. 180 tablet 4    Apoaequorin (PREVAGEN PO) Take 1 capsule by mouth Daily.      aspirin 81 MG EC tablet Take 1 tablet by mouth Daily. Hold 2 days prior to sx 6/22/22      atorvastatin (LIPITOR) 20 MG tablet Take 1 tablet by mouth Daily.      benazepril (LOTENSIN) 20 MG tablet Take 1 tablet by mouth Daily.      cyclobenzaprine (FLEXERIL) 10 MG tablet       Eliquis 5 MG tablet tablet Take 1 tablet by mouth Daily.      escitalopram (LEXAPRO) 10 MG tablet Take 1 tablet by mouth Daily.      furosemide (LASIX) 20 MG tablet Take 1 tablet by mouth Daily.      glucosamine sulfate 500 MG capsule capsule Take 2 capsules by mouth Every Morning.      metoprolol succinate XL (TOPROL-XL) 50 MG 24 hr tablet TAKE 1/2 TABLET BY MOUTH DAILY 45 tablet 1    omeprazole (priLOSEC) 40 MG capsule Take 1 capsule by mouth Daily.      tamsulosin (FLOMAX) 0.4 MG capsule 24 hr capsule Take 1 capsule by mouth Daily.      temazepam (RESTORIL) 30 MG capsule Take 1 capsule by mouth At Night As Needed.      traMADol (ULTRAM) 50 MG  "tablet Take 1 tablet by mouth.      benazepril (LOTENSIN) 20 MG tablet Take 1 tablet by mouth Daily. (Patient not taking: Reported on 6/24/2025)      escitalopram (LEXAPRO) 10 MG tablet Take 1 tablet by mouth Daily. (Patient not taking: Reported on 6/24/2025)      pantoprazole (PROTONIX) 40 MG EC tablet Take 1 tablet by mouth Daily. (Patient not taking: Reported on 6/24/2025)      tamsulosin (FLOMAX) 0.4 MG capsule 24 hr capsule Take 1 capsule by mouth Daily. (Patient not taking: Reported on 6/24/2025)       No current facility-administered medications for this visit.         The following portions of the patient's history were reviewed and updated as appropriate: allergies, current medications, past family history, past medical history, past social history, past surgical history and problem list.    Review of Systems:   14 point review of systems was performed. All systems negative except pertinent positives/negatives listed in HPI above    Physical Exam:   Vitals:    06/24/25 1037   Temp: 98.2 °F (36.8 °C)   Weight: 94.9 kg (209 lb 4.8 oz)   Height: 172.7 cm (68\")       General: A and O x 3, ASA, NAD    Skin clear no unusual lesions noted  Right knee the patient has some mild swelling noted, 110 degrees flexion neutral in extension positive Sean negative Lockman calf soft and nontender      Radiology:  Xrays 3views (ap,lateral, sunrise) bilateral knees were ordered and reviewed today secondary to pain the patient has bone-on-bone end-stage osteoarthritis of the right knee moderate arthritic changes noted of the left knee.  No comparative views available    Assessment/Plan: EndStage osteoarthritis right knee with increasing pain    Patient and I discussed options including continuing with conservative measures versus total knee replacement, he would like to proceed with right total knee replacement overnight stay.  He does have a history of pulmonary embolus he sees a hematologist who prescribes his Eliquis he " also has a cardiologist.  He will contact both providers for surgical clearance.  Continuation of conservative management vs. TKA discussed.  The patient wishes to proceed with total knee replacement.  At this point the patient has failed the full compliment of conservative treatment and stating complete understanding of the risks/benefits/ anternatives wishes to proceed with surgical treatment.    Risk and benefits of surgery were reviewed.  Including, but not limited to, blood clots or pulmonary embolism, anesthesia risk, infection, fracture, skin/leg numbness, persistent pain/crepitance/popping/catching, failure of the implant, need for future surgeries, hematoma, possible nerve or blood vessel injury, need for transfusion, and potential risk of stroke,heart attack or death, among others.  The patient understands and wishes to proceed.     It was explained that if tissue has been repaired or reconstructed, there is also an increased chance of failure which may require further management.  Following the completion of the discussion, the patient expressed understanding of this planned course of care, all their questions were answered and consent will be obtained preoperatively.    Operative Plan: Smith and Nephanny Oxinium Total Knee Replacement an overnight stay with home health rehab       Hina Conner, APRN  6/24/2025

## 2025-07-10 ENCOUNTER — TREATMENT (OUTPATIENT)
Dept: PHYSICAL THERAPY | Facility: CLINIC | Age: 70
End: 2025-07-10
Payer: MEDICARE

## 2025-07-10 DIAGNOSIS — M25.561 CHRONIC PAIN OF BOTH KNEES: Primary | ICD-10-CM

## 2025-07-10 DIAGNOSIS — M25.562 CHRONIC PAIN OF BOTH KNEES: Primary | ICD-10-CM

## 2025-07-10 DIAGNOSIS — M17.0 OSTEOARTHRITIS OF BOTH KNEES, UNSPECIFIED OSTEOARTHRITIS TYPE: ICD-10-CM

## 2025-07-10 DIAGNOSIS — G89.29 CHRONIC PAIN OF BOTH KNEES: Primary | ICD-10-CM

## 2025-07-10 NOTE — PATIENT INSTRUCTIONS
Access Code: [email or web address]  URL: [email or web address]  Date: 07/10/2025  Prepared by: Marylin Lunsford    Exercises  - Supine Quad Set  - 2 x daily - 7 x weekly - 3 sets - 10 reps  - Supine Short Arc Quad  - 2 x daily - 7 x weekly - 3 sets - 10 reps  - Active Straight Leg Raise with Quad Set  - 2 x daily - 7 x weekly - 3 sets - 10 reps - 5 sec hold  - Sidelying Hip Abduction  - 2 x daily - 7 x weekly - 3 sets - 10 reps - 5 sec hold  - Sidelying Hip Adduction  - 2 x daily - 7 x weekly - 3 sets - 10 reps - 5 sec hold  - Supine Piriformis Stretch with Foot on Ground  - 2 x daily - 7 x weekly - 1 sets - 4 reps - 15 sec hold  - Supine Figure 4 Piriformis Stretch  - 2 x daily - 7 x weekly - 1 sets - 4 reps - 15 sec hold

## 2025-07-10 NOTE — PROGRESS NOTES
Physical Therapy Initial Evaluation and Plan of Care  [facility] [place]  [address]  [place] [address]  P: [phone] F: [phone]    Patient: Manuel Rolle   : 1955  Diagnosis/ICD-10 Code:  Chronic pain of both knees [M25.561, M25.562, G89.29]  Referring practitioner: MAKAYLA Rajan  Date of Initial Visit: 7/10/2025  Today's Date: 7/10/2025  Patient seen for 1 sessions         Visit Diagnoses:    ICD-10-CM ICD-9-CM   1. Chronic pain of both knees  M25.561 719.46    M25.562 338.29    G89.29    2. Osteoarthritis of both knees, unspecified osteoarthritis type  M17.0 715.96         Subjective Questionnaire: LEFS: 66/80      Subjective Evaluation    History of Present Illness  Mechanism of injury: Pt reports chronic knee pain R>L secondary to primary OA. States he has had a series of steroid injections, oral steroids, and gel shots. States the MD informed him his R knee is bone on bone and TKA is indicated. Scheduled for R TKA on 25    Goes to the [facility] ~2-3x/wk. Plays pickleball - doesn't hurt to play but it does afterwards.     PMH: R knee avulsion fracture and surgery (56 years ago), plate in R ankle, cataract surgery,  back surgery (cyst removal). Hip pain    Quality of life: excellent    Pain  Current pain ratin  At best pain ratin  At worst pain ratin  Location: R>L knee pain.  Quality: discomfort (feels like it's going to give out, weak, hurts, stabbing)  Relieving factors: rest  Aggravating factors: stairs, standing and squatting  Progression: worsening    Social Support  Patient lives at: has stairs at home.    Patient Goals  Patient goals for therapy: decreased edema, improved balance, decreased pain, increased motion, increased strength and return to sport/leisure activities             Objective          Tenderness     Right Knee   Tenderness in the medial joint line.     Neurological Testing     Sensation     Lumbar   Left    Intact: light touch    Right   Intact: light touch    Active Range of Motion   Left Knee   Flexion: 123 degrees   Extension: 0 degrees     Right Knee   Flexion: 117 degrees with pain  Extension: Right knee active extension: lacking 1 degrees.     Patellar Mobility   Left Knee Patellar tendons within functional limits include the medial, lateral, superior and inferior.     Right Knee Patellar tendons within functional limits include the medial, lateral, superior and inferior.     Patellar Mobility Comments   Right superior tendon comments: discomfort noted.     Strength/Myotome Testing     Left Hip   Planes of Motion   Flexion: 4-  Abduction: 4  Adduction: 4    Right Hip   Planes of Motion   Flexion: 4  Abduction: 4  Adduction: 4    Left Knee   Flexion: 4-  Extension: 4    Right Knee   Flexion: 4  Extension: 4+    Left Ankle/Foot   Dorsiflexion: 5    Right Ankle/Foot   Dorsiflexion: 5      Updated, printed, and reviewed HEP at the end of the session today.      Assessment & Plan       Assessment  Impairments: abnormal gait, abnormal or restricted ROM, activity intolerance, impaired balance, impaired physical strength, lacks appropriate home exercise program, pain with function and weight-bearing intolerance   Functional limitations: walking, uncomfortable because of pain, standing and stooping   Assessment details: The patient is a 70 y.o. male who presents to physical therapy today for R>L knee pain. Patient presents to therapy with LE strength deficits, tenderness to R medial joint line,66/80 LEFS score, and mild limitation in R knee AROM as compared to L. Patient reports difficulty with squatting, stair negotiation, getting up in the morning, and prolonged walking/standing secondary to symptoms. Skilled Physical Therapy services needed to address listed impairments and improve upon ability to perform functional activities.  Prognosis: good    Goals  Plan Goals: ST weeks  1. Pt will be independent and  compliant with initial HEP  2. Pt will report at least 50% improvement in pain and function since beginning PT.  3. Pt will demonstrate stair negotiation with reciprocal pattern without use of handrail.    LT weeks  1. Pt will present with at least 4+/5 MMT score for LE strength for improved tolerance to stair negotiation and squatting tasks.  2. Pt will improve LEFS score to at least 72/80 for improved self perceived LE function  3. Pt will be able to  perform 12 consecutive STS transfers in 30 seconds to implicate good LE functional strength and endurance.       Plan  Therapy options: will be seen for skilled therapy services  Planned modality interventions: dry needling, TENS, electrical stimulation/Macanese stimulation, ultrasound, cryotherapy and thermotherapy (hydrocollator packs)  Planned therapy interventions: manual therapy, motor coordination training, neuromuscular re-education, balance/weight-bearing training, body mechanics training, postural training, soft tissue mobilization, flexibility, strengthening, functional ROM exercises, gait training, stretching, therapeutic activities, home exercise program, transfer training, joint mobilization and spinal/joint mobilization  Frequency: 2x week  Duration in weeks: 5  Treatment plan discussed with: patient        See flowsheets for treatment detail.    History # of Personal Factors and/or Comorbidities: MODERATE (1-2)  Examination of Body System(s): # of elements: LOW (1-2)  Clinical Presentation: STABLE   Clinical Decision Making: MODERATE    Timed:         Manual Therapy:         mins  70032;     Therapeutic Exercise:    10     mins  74693;     Neuromuscular Pablo:    15    mins  86079;    Therapeutic Activity:          mins  31125;     Gait Training:           mins  86671;     Ultrasound:          mins  04801;    Ionto                                   mins   46058  Self Care                            mins   39404      Un-Timed:  Electrical  Stimulation:         mins  22830 ( );  Dry Needling          mins self-pay  Traction          mins 68969  Low Eval     20     Mins  17649  Mod Eval          Mins  76471  High Eval                            Mins  32552  Re-Eval                               mins  23458  Canalith Repos                   mins 27827      Timed Treatment:   25   mins   Total Treatment:     45   mins    PT SIGNATURE: Marylin Lunsford, PT , DPT  DATE TREATMENT INITIATED: 7/10/2025  License Number: [id number]      Initial Certification  Certification Period:  7/10/2025 thru 10/7/2025  I certify that the therapy services are furnished while this patient is under my care.  The services outlined above are required by this patient, and will be reviewed every 90 days.       Physician Signature:__________________________________________________    PHYSICIAN: Hina Conner APRN  NPI: [id number]                                      DATE:    Please sign and return via fax to [phone].. Thank you, [facility].

## 2025-07-15 ENCOUNTER — TREATMENT (OUTPATIENT)
Dept: PHYSICAL THERAPY | Facility: CLINIC | Age: 70
End: 2025-07-15
Payer: MEDICARE

## 2025-07-15 DIAGNOSIS — M17.0 OSTEOARTHRITIS OF BOTH KNEES, UNSPECIFIED OSTEOARTHRITIS TYPE: ICD-10-CM

## 2025-07-15 DIAGNOSIS — M25.561 CHRONIC PAIN OF BOTH KNEES: Primary | ICD-10-CM

## 2025-07-15 DIAGNOSIS — M25.562 CHRONIC PAIN OF BOTH KNEES: Primary | ICD-10-CM

## 2025-07-15 DIAGNOSIS — G89.29 CHRONIC PAIN OF BOTH KNEES: Primary | ICD-10-CM

## 2025-07-15 PROCEDURE — 97530 THERAPEUTIC ACTIVITIES: CPT | Performed by: PHYSICAL THERAPIST

## 2025-07-15 PROCEDURE — 97110 THERAPEUTIC EXERCISES: CPT | Performed by: PHYSICAL THERAPIST

## 2025-07-15 NOTE — PROGRESS NOTES
Physical Therapy Daily Note      Manuel Rolle was seen by Guillermo Garcia PT at [facility]  [address] [facility] [address]  [place] [address]  Fax [phone]  Phone [phone]       Diagnosis Plan   1. Chronic pain of both knees        2. Osteoarthritis of both knees, unspecified osteoarthritis type            VISIT#: 2  Referring practitioner: MAKAYLA Rajan    Subjective   Manuel Rolle reports: status as on exam; going to [facility] and working out.       Objective     See Exercise, Manual, and Modality Logs for complete treatment.     Patient Education:    Assessment/Plan  - reviewed gym program to supplement HEP and therapy - advised to focus on higher reps with low loads to not aggravate knees and joints.  - good response to exercises without increase in pain    Progress strengthening /stabilization /functional activity            Timed:         Manual Therapy:         mins  38272;     Therapeutic Exercise:    20     mins  06715;     Neuromuscular Pablo:        mins  45085;    Therapeutic Activity:    10      mins  39158;     Gait Training:          mins  01544;     Ultrasound:          mins  77499;    Ionto                                   mins   66651  Self Care                            mins   31231  Aquatic                              mins 91242    Un-Timed:  Canalith Repos                   mins  68110  Electrical Stimulation:        mins  29408 ( );  Dry Needling          mins self-pay  Traction          mins 54361  Low Eval          Mins  09538  Mod Eval          Mins  71471  High Eval                           Mins  13027  Re-Eval                               mins  67502    Timed Treatment:   30   mins   Total Treatment:      30  mins    Guillermo Garcia PT PT, DPT, [place] License #: [id number]

## 2025-07-16 ENCOUNTER — TELEPHONE (OUTPATIENT)
Dept: CARDIOLOGY | Facility: CLINIC | Age: 70
End: 2025-07-16
Payer: MEDICARE

## 2025-07-16 NOTE — TELEPHONE ENCOUNTER
FACILITY: [facility]  DR:   PHONE:   FAX: [phone]  PROCEDURE: R TOTAL KNEE ARTHROPLASTY  SCHEDULED: 8/18/25  MEDS TO HOLD: ELIQUIS & ASA    WILL PLACE CARDIAC CLEARANCE WITH THE PATIENTS CHART TO BE COMPLETED AND SIGNED BY DR PEREZ WHEN PATIENT IS SEEN IN THE OFFICE 7/23/25.    CALLED PATIENT AND VERIFIED WITH THE PATIENT THAT HE IS TAKING ELIQUIS 2.5 MG DAILY AND NO LONGER IS TAKING 5 MG BID.

## 2025-07-17 ENCOUNTER — TREATMENT (OUTPATIENT)
Dept: PHYSICAL THERAPY | Facility: CLINIC | Age: 70
End: 2025-07-17
Payer: MEDICARE

## 2025-07-17 DIAGNOSIS — M25.561 CHRONIC PAIN OF BOTH KNEES: Primary | ICD-10-CM

## 2025-07-17 DIAGNOSIS — M17.0 OSTEOARTHRITIS OF BOTH KNEES, UNSPECIFIED OSTEOARTHRITIS TYPE: ICD-10-CM

## 2025-07-17 DIAGNOSIS — M25.562 CHRONIC PAIN OF BOTH KNEES: Primary | ICD-10-CM

## 2025-07-17 DIAGNOSIS — G89.29 CHRONIC PAIN OF BOTH KNEES: Primary | ICD-10-CM

## 2025-07-17 PROCEDURE — 97110 THERAPEUTIC EXERCISES: CPT | Performed by: PHYSICAL THERAPIST

## 2025-07-17 PROCEDURE — 97530 THERAPEUTIC ACTIVITIES: CPT | Performed by: PHYSICAL THERAPIST

## 2025-07-17 PROCEDURE — 97112 NEUROMUSCULAR REEDUCATION: CPT | Performed by: PHYSICAL THERAPIST

## 2025-07-17 NOTE — PROGRESS NOTES
Physical Therapy Daily Note      Manuel Rolle was seen by Guillermo Garcia, PT at [facility] PH[facility]ICAL [facility]ER[facility]  [address]  [place] [address]  Fax [phone]  Phone [phone]       Diagnosis Plan   1. Chronic pain of both knees        2. Osteoarthritis of both knees, unspecified osteoarthritis type            VISIT#: 3  Referring practitioner: MAKAYLA Rajan    Subjective   Manuel Rolle reports: began using concepts of safely exercising in gym without issue: starts with RCB.      Objective     See Exercise, Manual, and Modality Logs for complete treatment.     Patient Education:    Assessment/Plan  - initiated therapy early.  - progressed balance and hip strengthening without issue.  - LOB with toe raises.  - good response to exercises without increase in pain    Goals  Plan Goals: ST weeks  1. Pt will be independent and compliant with initial HEP  2. Pt will report at least 50% improvement in pain and function since beginning PT.  3. Pt will demonstrate stair negotiation with reciprocal pattern without use of handrail.     LT weeks  1. Pt will present with at least 4+/5 MMT score for LE strength for improved tolerance to stair negotiation and squatting tasks.  2. Pt will improve LEFS score to at least 72/80 for improved self perceived LE function  3. Pt will be able to  perform 12 consecutive STS transfers in 30 seconds to implicate good LE functional strength and endurance.      Progress strengthening /stabilization /functional activity            Timed:         Manual Therapy:         mins  58951;     Therapeutic Exercise:    20     mins  79124;     Neuromuscular Pablo:   10     mins  00530;    Therapeutic Activity:    10      mins  53251;     Gait Training:          mins  50756;     Ultrasound:          mins  83548;    Ionto                                   mins   80512  Self Care                            mins   92303  Aquatic                               mins 04961    Un-Timed:  Canalith Repos                   mins  82761  Electrical Stimulation:        mins  57697 ( );  Dry Needling          mins self-pay  Traction          mins 00614  Low Eval          Mins  98965  Mod Eval          Mins  24555  High Eval                           Mins  48716  Re-Eval                               mins  77943    Timed Treatment:   40   mins   Total Treatment:      40  mins    Guillermo Garcia PT PT, DPT, [place] License #: [id number]

## 2025-07-21 ENCOUNTER — TREATMENT (OUTPATIENT)
Dept: PHYSICAL THERAPY | Facility: CLINIC | Age: 70
End: 2025-07-21
Payer: MEDICARE

## 2025-07-21 DIAGNOSIS — M25.561 CHRONIC PAIN OF BOTH KNEES: Primary | ICD-10-CM

## 2025-07-21 DIAGNOSIS — M25.562 CHRONIC PAIN OF BOTH KNEES: Primary | ICD-10-CM

## 2025-07-21 DIAGNOSIS — M17.0 OSTEOARTHRITIS OF BOTH KNEES, UNSPECIFIED OSTEOARTHRITIS TYPE: ICD-10-CM

## 2025-07-21 DIAGNOSIS — G89.29 CHRONIC PAIN OF BOTH KNEES: Primary | ICD-10-CM

## 2025-07-21 PROCEDURE — 97110 THERAPEUTIC EXERCISES: CPT

## 2025-07-21 NOTE — PROGRESS NOTES
Physical Therapy Daily Treatment Note      [facility]              [address]                [place]  [address]        Patient: Manuel Rolle   : 1955  Diagnosis/ICD-10 Code:  Chronic pain of both knees [M25.561, M25.562, G89.29]  Referring practitioner: MAKAYLA Rajan  Date of Initial Visit: Type: THERAPY  Noted: 7/10/2025  Today's Date: 2025  Patient seen for 4 sessions         Visit Diagnoses:    ICD-10-CM ICD-9-CM   1. Chronic pain of both knees  M25.561 719.46    M25.562 338.29    G89.29    2. Osteoarthritis of both knees, unspecified osteoarthritis type  M17.0 715.96         Subjective    Manuel Rolle reports: his knees are feeling great.  He said they still hurt but not like they did.  The exercises are helping and sometimes it makes him think he doesn't need surgery.            Objective   See Exercise, Manual, and Modality Logs for complete treatment.       Assessment/Plan  Continued with strengthening with progressions as noted.  Cues provided intermittently during the session for technique and alignment with exercises.  He tolerate exercises well.  Will monitor and progress as able to prepare for upcoming surgery.     Progress per Plan of Care           Timed:  Manual Therapy:         mins  64421;  Therapeutic Exercise:    23     mins  96047;     Neuromuscular Pablo:        mins  07590;    Therapeutic Activity:     5     mins  19798;     Gait Training:           mins  97095;     Ultrasound:          mins  64316;     Work Conditioning/Hardening (initial 2 hours)        mins  99260  Work Conditioning/Hardening (each add'l hour)        mins  42541    Untimed:   Electrical Stimulation:         mins  73959 ( );  Traction          mins 63821    Timed Treatment:   28   mins   Total Treatment:     28   mins    Bren uRbi PTA  Physical Therapist Assistant  [place] License# [id number]

## 2025-07-22 ENCOUNTER — TELEPHONE (OUTPATIENT)
Dept: ONCOLOGY | Facility: HOSPITAL | Age: 70
End: 2025-07-22
Payer: MEDICARE

## 2025-07-22 ENCOUNTER — TREATMENT (OUTPATIENT)
Dept: PHYSICAL THERAPY | Facility: CLINIC | Age: 70
End: 2025-07-22
Payer: MEDICARE

## 2025-07-22 DIAGNOSIS — M25.561 CHRONIC PAIN OF BOTH KNEES: Primary | ICD-10-CM

## 2025-07-22 DIAGNOSIS — G89.29 CHRONIC PAIN OF BOTH KNEES: Primary | ICD-10-CM

## 2025-07-22 DIAGNOSIS — M25.562 CHRONIC PAIN OF BOTH KNEES: Primary | ICD-10-CM

## 2025-07-22 DIAGNOSIS — M17.0 OSTEOARTHRITIS OF BOTH KNEES, UNSPECIFIED OSTEOARTHRITIS TYPE: ICD-10-CM

## 2025-07-22 RX ORDER — ENOXAPARIN SODIUM 100 MG/ML
1 INJECTION SUBCUTANEOUS EVERY 12 HOURS SCHEDULED
Qty: 18 ML | Refills: 0 | Status: SHIPPED | OUTPATIENT
Start: 2025-07-22

## 2025-07-22 NOTE — PROGRESS NOTES
Physical Therapy Daily Treatment Note      [facility] [place]              [address]                [place]  [address]        Patient: Manuel Rolle   : 1955  Diagnosis/ICD-10 Code:  Chronic pain of both knees [M25.561, M25.562, G89.29]  Referring practitioner: MAKAYLA Rajan  Date of Initial Visit: Type: THERAPY  Noted: 7/10/2025  Today's Date: 2025  Patient seen for 5 sessions         Visit Diagnoses:    ICD-10-CM ICD-9-CM   1. Chronic pain of both knees  M25.561 719.46    M25.562 338.29    G89.29    2. Osteoarthritis of both knees, unspecified osteoarthritis type  M17.0 715.96         Subjective    Manuel Rolle reports: he did well after yesterday's session.  He was able to play pickleball without problems.  He plans to go to the  after this. He thinks today was the first time he has been able to sleep on his (L) side without pain.           Objective   See Exercise, Manual, and Modality Logs for complete treatment.     Education: discussed post surgery equipment needs.  Edu he could get adjustable ankle weights but if he is compliant with exercises he may not need them long so may be better to build program around bands.     Assessment/Plan  Progressed with standing and functional strengthening as noted as well as progressed with balance training.  He tolerated progressions well with no increased knee pain reported.  Will monitor and progress as able to prepare for upcoming surgery.      Progress per Plan of Care           Timed:  Manual Therapy:         mins  61928;  Therapeutic Exercise:    20     mins  61534;     Neuromuscular Pablo:    10    mins  32077;    Therapeutic Activity:     15     mins  92942;     Gait Training:           mins  27692;     Ultrasound:          mins  44505;     Work Conditioning/Hardening (initial 2 hours)        mins  22449  Work Conditioning/Hardening (each add'l hour)        mins  58824    Untimed:   Electrical Stimulation:         mins  20643  ( );  Traction          mins 92179    Timed Treatment:   45   mins   Total Treatment:     45   mins    Bren Rubi PTA  Physical Therapist Assistant  [place] License# [id number]

## 2025-07-22 NOTE — TELEPHONE ENCOUNTER
Called patient to discuss Apixaban hold with Lovenox Bridge for procedures on 7/31/25 and 8/18/25.     Patient is to hold Apixaban for 5 days prior to procedures and begin Lovenox at time of first missed dose of Apixaban. Patient is to continue injections until 24 hours prior to procedure and then they are to hold all anticoagulation.  Resume Apixaban post procedure at discretion of surgeon. Patient requested Lovenox be sent to [facility] on [address]. Sent calendars and lovenox administration instructions via Bright[employer] message. Patient to call clinic at [phone] with any questions or concerns.     Sammie Brown, PharmD, BCPS, BCCCP  15:20 EDT

## 2025-07-23 ENCOUNTER — OFFICE VISIT (OUTPATIENT)
Dept: CARDIOLOGY | Facility: CLINIC | Age: 70
End: 2025-07-23
Payer: MEDICARE

## 2025-07-23 VITALS
HEART RATE: 53 BPM | WEIGHT: 211 LBS | SYSTOLIC BLOOD PRESSURE: 128 MMHG | HEIGHT: 68 IN | BODY MASS INDEX: 31.98 KG/M2 | DIASTOLIC BLOOD PRESSURE: 75 MMHG | OXYGEN SATURATION: 99 %

## 2025-07-23 DIAGNOSIS — G47.33 OBSTRUCTIVE SLEEP APNEA: ICD-10-CM

## 2025-07-23 DIAGNOSIS — Z01.810 PREOP CARDIOVASCULAR EXAM: ICD-10-CM

## 2025-07-23 DIAGNOSIS — E78.00 PURE HYPERCHOLESTEROLEMIA: ICD-10-CM

## 2025-07-23 DIAGNOSIS — I25.10 CORONARY ARTERY DISEASE INVOLVING NATIVE CORONARY ARTERY OF NATIVE HEART WITHOUT ANGINA PECTORIS: ICD-10-CM

## 2025-07-23 DIAGNOSIS — I10 PRIMARY HYPERTENSION: Primary | ICD-10-CM

## 2025-07-23 DIAGNOSIS — I34.0 NONRHEUMATIC MITRAL VALVE REGURGITATION: ICD-10-CM

## 2025-07-23 NOTE — PROGRESS NOTES
Subjective:     Encounter Date:07/23/2025      Patient ID: Manuel Rolle is a 70 y.o. male.    Chief Complaint:  Coronary Artery Disease  Symptoms include leg swelling. Pertinent negatives include no chest pain, dizziness, palpitations or shortness of breath.     70-year-old white male with history of coronary disease history of valvular heart disease hypertension hyperlipidemia sleep apnea presents to my office for follow-up.  Patient is currently stable without any symptoms of chest pain or shortness of breath at rest or exertion.  No compressively PND or orthopnea.  No palpitation dizziness syncope.  Patient has some swelling of the feet.  He is taking his medicines regular.  He needs preop evaluation for knee surgery  The following portions of the patient's history were reviewed and updated as appropriate: allergies, current medications, past family history, past medical history, past social history, past surgical history, and problem list.  Past Medical History:   Diagnosis Date    Arthritis     CPAP (continuous positive airway pressure) dependence     Enlarged prostate     GERD (gastroesophageal reflux disease)     Hypertension     Pulmonary embolism 04/2022    lungs     Past Surgical History:   Procedure Laterality Date    ANKLE SURGERY      CARDIAC CATHETERIZATION      CARDIAC CATHETERIZATION N/A 11/25/2024    Procedure: Left Heart Cath with angiogram;  Surgeon: Celso Nichole MD;  Location: [facility] CATH INVASIVE LOCATION;  Service: Cardiovascular;  Laterality: N/A;    CARDIAC CATHETERIZATION Right 11/25/2024    Procedure: Coronary angiography;  Surgeon: Celso Nichole MD;  Location: [facility] CATH INVASIVE LOCATION;  Service: Cardiovascular;  Laterality: Right;    CATARACT EXTRACTION Bilateral     COLONOSCOPY  2019    COLONOSCOPY N/A 06/22/2022    Procedure: COLONOSCOPY with polypectomy x5;  Surgeon: Dahiana Brizuela MD;  Location: [facility] ENDOSCOPY;  Service: Gastroenterology;  Laterality: N/A;  post: colon  "polyps, diverticulosis, hemorrhoids    ENDOSCOPY N/A 06/22/2022    Procedure: ESOPHAGOGASTRODUODENOSCOPY with biopsy x1 area;  Surgeon: Dahiana Brizuela MD;  Location: [facility] ENDOSCOPY;  Service: Gastroenterology;  Laterality: N/A;  post: hiatal hernia, gastritis    HERNIA REPAIR       /75   Pulse 53   Ht 172.7 cm (67.99\")   Wt 95.7 kg (211 lb)   SpO2 99%   BMI 32.09 kg/m²   Family History   Problem Relation Age of Onset    Heart disease Mother     Hypertension Father        Current Outpatient Medications:     amLODIPine (NORVASC) 5 MG tablet, Take 1 tablet by mouth Daily., Disp: 90 tablet, Rfl: 3    apixaban (ELIQUIS) 2.5 MG tablet tablet, Take 1 tablet by mouth Daily., Disp: , Rfl:     aspirin 81 MG EC tablet, Take 1 tablet by mouth Daily. Hold 2 days prior to sx 6/22/22, Disp: , Rfl:     atorvastatin (LIPITOR) 20 MG tablet, Take 1 tablet by mouth Daily., Disp: , Rfl:     benazepril (LOTENSIN) 20 MG tablet, Take 1 tablet by mouth Daily., Disp: , Rfl:     cyclobenzaprine (FLEXERIL) 10 MG tablet, , Disp: , Rfl:     enoxaparin sodium (LOVENOX) 100 MG/ML solution prefilled syringe syringe, Inject 0.9 mL under the skin into the appropriate area as directed Every 12 (Twelve) Hours., Disp: 18 mL, Rfl: 0    escitalopram (LEXAPRO) 10 MG tablet, Take 1 tablet by mouth Daily., Disp: , Rfl:     furosemide (LASIX) 20 MG tablet, Take 1 tablet by mouth Daily., Disp: , Rfl:     glucosamine sulfate 500 MG capsule capsule, Take 2 capsules by mouth Every Morning., Disp: , Rfl:     metoprolol succinate XL (TOPROL-XL) 50 MG 24 hr tablet, TAKE 1/2 TABLET BY MOUTH DAILY, Disp: 45 tablet, Rfl: 1    omeprazole (priLOSEC) 40 MG capsule, Take 1 capsule by mouth Daily., Disp: , Rfl:     tamsulosin (FLOMAX) 0.4 MG capsule 24 hr capsule, Take 1 capsule by mouth Daily., Disp: , Rfl:     temazepam (RESTORIL) 30 MG capsule, Take 1 capsule by mouth At Night As Needed., Disp: , Rfl:     Apoaequorin (PREVAGEN PO), Take 1 capsule by mouth " Daily. (Patient not taking: Reported on 7/23/2025), Disp: , Rfl:     benazepril (LOTENSIN) 20 MG tablet, Take 1 tablet by mouth Daily. (Patient not taking: Reported on 7/23/2025), Disp: , Rfl:     pantoprazole (PROTONIX) 40 MG EC tablet, Take 1 tablet by mouth Daily. (Patient not taking: Reported on 7/23/2025), Disp: , Rfl:     tamsulosin (FLOMAX) 0.4 MG capsule 24 hr capsule, Take 1 capsule by mouth Daily. (Patient not taking: Reported on 7/23/2025), Disp: , Rfl:   No Known Allergies  Social History     Socioeconomic History    Marital status:    Tobacco Use    Smoking status: Former    Smokeless tobacco: Never    Tobacco comments:     Quit approximately 1995   Vaping Use    Vaping status: Never Used   Substance and Sexual Activity    Alcohol use: Yes     Comment: 1-2 a week    Drug use: Not Currently    Sexual activity: Defer     Review of Systems   Constitutional: Positive for malaise/fatigue.   Cardiovascular:  Positive for leg swelling. Negative for chest pain, dyspnea on exertion and palpitations.   Respiratory:  Negative for cough and shortness of breath.    Gastrointestinal:  Negative for abdominal pain, nausea and vomiting.   Neurological:  Negative for dizziness, headaches, light-headedness, numbness and weakness.   All other systems reviewed and are negative.             Objective:     Constitutional:       Appearance: Well-developed.   Eyes:      General: No scleral icterus.     Conjunctiva/sclera: Conjunctivae normal.   HENT:      Head: Normocephalic and atraumatic.   Neck:      Vascular: No carotid bruit or JVD.   Pulmonary:      Effort: Pulmonary effort is normal.      Breath sounds: Normal breath sounds. No wheezing. No rales.   Cardiovascular:      Normal rate. Regular rhythm.   Pulses:     Intact distal pulses.   Abdominal:      General: Bowel sounds are normal.      Palpations: Abdomen is soft.   Musculoskeletal:      Cervical back: Normal range of motion and neck supple. Skin:     General:  Skin is warm and dry.      Findings: No rash.   Neurological:      Mental Status: Alert.       Procedures    Lab Review:         MDM    #1 coronary artery disease  Patient has nonobstructivedisease of about 30 to 60% in all 3 arteries and is currently stable without any angina with medical therapy with aspirin 81 mg atorvastatin 20 mg and metoprolol 50 mg once a day.  Patient has normal LV function.  2.  Hypertension  Patient blood pressure currently stable on metoprolol XL 50 mg once a day and amlodipine 5 mg once a day   3.  Hyperlipidemia  Patient is currently on atorvastatin 20 mg once a day  4.  Sleep apnea  Patient is sleep apnea and uses a CPAP machine  5.  Factor V deficiency  Patient is on Eliquis and managed by the hematologist  6.  Mitral regurgitation  Patient has history of mitral regurgitation which is only moderate by DEREK.  Patient currently on medical therapy.    Patient's previous medical records, labs, and EKG were reviewed and discussed with the patient at today's visit.

## 2025-07-28 ENCOUNTER — TREATMENT (OUTPATIENT)
Dept: PHYSICAL THERAPY | Facility: CLINIC | Age: 70
End: 2025-07-28
Payer: MEDICARE

## 2025-07-28 DIAGNOSIS — M17.0 OSTEOARTHRITIS OF BOTH KNEES, UNSPECIFIED OSTEOARTHRITIS TYPE: ICD-10-CM

## 2025-07-28 DIAGNOSIS — M25.561 CHRONIC PAIN OF BOTH KNEES: Primary | ICD-10-CM

## 2025-07-28 DIAGNOSIS — G89.29 CHRONIC PAIN OF BOTH KNEES: Primary | ICD-10-CM

## 2025-07-28 DIAGNOSIS — M25.562 CHRONIC PAIN OF BOTH KNEES: Primary | ICD-10-CM

## 2025-07-28 PROCEDURE — 97110 THERAPEUTIC EXERCISES: CPT

## 2025-07-28 PROCEDURE — 97112 NEUROMUSCULAR REEDUCATION: CPT

## 2025-07-28 PROCEDURE — 97530 THERAPEUTIC ACTIVITIES: CPT

## 2025-07-28 NOTE — PROGRESS NOTES
Physical Therapy Daily Treatment Note    Patient: Manule Rolle  : 1955  Referring practitioner: MAKAYLA Rajan  Today's Date: 2025    VISIT#: 6    Visit Diagnoses:    ICD-10-CM ICD-9-CM   1. Chronic pain of both knees  M25.561 719.46    M25.562 338.29    G89.29    2. Osteoarthritis of both knees, unspecified osteoarthritis type  M17.0 715.96     LEFS: 59/80  Subjective   Manuel Rolle reports: His knees are a little stiff after not doing his exercises over the weekend due to being on vacation. States he had an adventurous time carrillo diving, ATV driving, etc. Knees held up well overall. Discussed Dc'ing today with continuation of progressive HEP and pt was agreeable to this. States he has been going to the gym.      Objective   Tenderness      Right Knee   Tenderness in the medial joint line.      Neurological Testing      Sensation      Lumbar   Left   Intact: light touch     Right   Intact: light touch     Active Range of Motion   Left Knee   Flexion: 123 degrees   Extension: 0 degrees      Right Knee   Flexion: 120 degrees with pain  Extension: Right knee active extension: lacking 1 degrees.         Strength/Myotome Testing      Left Hip   Planes of Motion   Flexion: 4  Abduction: 4  Adduction: 4     Right Hip   Planes of Motion   Flexion: 4  Abduction: 4  Adduction: 4     Left Knee   Flexion: 4  Extension: 4     Right Knee   Flexion: 4  Extension: 4+     Left Ankle/Foot   Dorsiflexion: 5     Right Ankle/Foot   Dorsiflexion: 5     30 sec SST: 9      See Exercise, Manual, and Modality Logs for complete treatment.     Patient Education: Continue with HEP.    Assessment/Plan  Pt progressing well with PT intervention. He demo's independence with exercises and is appropriate to continue with HEP for further strengthening until TKA on 25.    Other DC at this time with continuation of progressive HEP.    Goals  Plan Goals: ST weeks  1. Pt will be independent and compliant with initial HEP  MET  2. Pt will report at least 50% improvement in pain and function since beginning PT. PROGRESSING  3. Pt will demonstrate stair negotiation with reciprocal pattern without use of handrail. PROGRESSING     LT weeks  1. Pt will present with at least 4+/5 MMT score for LE strength for improved tolerance to stair negotiation and squatting tasks. PROGRESSING  2. Pt will improve LEFS score to at least 72/80 for improved self perceived LE function NOT MET  3. Pt will be able to  perform 12 consecutive STS transfers in 30 seconds to implicate good LE functional strength and endurance. NOT MET          Timed:         Manual Therapy:         mins  82713;     Therapeutic Exercise:    9     mins  96333;     Neuromuscular Pablo:    16    mins  76989;    Therapeutic Activity:     15     mins  09357;     Gait Training:           mins  73653;     Ultrasound:          mins  28521;    Ionto                                   mins   21881  Self Care                            mins   96927      Un-Timed:  Electrical Stimulation:         mins  40683 ( );  Dry Needling          mins self-pay  Traction          mins 75293  Re-Eval                               mins  01359  Canalith Repos                   mins 98544    Timed Treatment:   40   mins   Total Treatment:     40   mins    Marylin Lunsford, PT , DPT  Physical Therapist   License: [id number]

## 2025-07-28 NOTE — LETTER
Physical Therapy Daily Treatment Note    Patient: Manuel Rolle  : 1955  Referring practitioner: MAKAYLA Rajan  Today's Date: 2025    VISIT#: 6    Visit Diagnoses:    ICD-10-CM ICD-9-CM   1. Chronic pain of both knees  M25.561 719.46    M25.562 338.29    G89.29    2. Osteoarthritis of both knees, unspecified osteoarthritis type  M17.0 715.96     LEFS: 59/80  Subjective   Manuel Rolle reports: His knees are a little stiff after not doing his exercises over the weekend due to being on vacation. States he had an adventurous time carrillo diving, ATV driving, etc. Knees held up well overall. Discussed Dc'ing today with continuation of progressive HEP and pt was agreeable to this. States he has been going to the gym.      Objective   Tenderness      Right Knee   Tenderness in the medial joint line.      Neurological Testing      Sensation      Lumbar   Left   Intact: light touch     Right   Intact: light touch     Active Range of Motion   Left Knee   Flexion: 123 degrees   Extension: 0 degrees      Right Knee   Flexion: 120 degrees with pain  Extension: Right knee active extension: lacking 1 degrees.         Strength/Myotome Testing      Left Hip   Planes of Motion   Flexion: 4  Abduction: 4  Adduction: 4     Right Hip   Planes of Motion   Flexion: 4  Abduction: 4  Adduction: 4     Left Knee   Flexion: 4  Extension: 4     Right Knee   Flexion: 4  Extension: 4+     Left Ankle/Foot   Dorsiflexion: 5     Right Ankle/Foot   Dorsiflexion: 5     30 sec SST: 9      See Exercise, Manual, and Modality Logs for complete treatment.     Patient Education: Continue with HEP.    Assessment/Plan  Pt progressing well with PT intervention. He demo's independence with exercises and is appropriate to continue with HEP for further strengthening until TKA on 25.    Other DC at this time with continuation of progressive HEP.    Goals  Plan Goals: ST weeks  1. Pt will be independent and compliant with initial HEP  MET  2. Pt will report at least 50% improvement in pain and function since beginning PT. PROGRESSING  3. Pt will demonstrate stair negotiation with reciprocal pattern without use of handrail. PROGRESSING     LT weeks  1. Pt will present with at least 4+/5 MMT score for LE strength for improved tolerance to stair negotiation and squatting tasks. PROGRESSING  2. Pt will improve LEFS score to at least 72/80 for improved self perceived LE function NOT MET  3. Pt will be able to  perform 12 consecutive STS transfers in 30 seconds to implicate good LE functional strength and endurance. NOT MET          Timed:         Manual Therapy:         mins  07479;     Therapeutic Exercise:    9     mins  40409;     Neuromuscular Pablo:    16    mins  19363;    Therapeutic Activity:     15     mins  27147;     Gait Training:           mins  39773;     Ultrasound:          mins  75519;    Ionto                                   mins   73649  Self Care                            mins   06632      Un-Timed:  Electrical Stimulation:         mins  98646 ( );  Dry Needling          mins self-pay  Traction          mins 00482  Re-Eval                               mins  85464  Canalith Repos                   mins 84261    Timed Treatment:   40   mins   Total Treatment:     40   mins    Marylin Lunsford, PT , DPT  Physical Therapist   License: [id number]

## 2025-08-04 ENCOUNTER — PRE-ADMISSION TESTING (OUTPATIENT)
Dept: PREADMISSION TESTING | Facility: HOSPITAL | Age: 70
End: 2025-08-04
Payer: MEDICARE

## 2025-08-04 VITALS
BODY MASS INDEX: 33.59 KG/M2 | RESPIRATION RATE: 18 BRPM | SYSTOLIC BLOOD PRESSURE: 129 MMHG | WEIGHT: 214 LBS | DIASTOLIC BLOOD PRESSURE: 83 MMHG | HEART RATE: 53 BPM | HEIGHT: 67 IN | TEMPERATURE: 98.5 F | OXYGEN SATURATION: 96 %

## 2025-08-04 LAB
ANION GAP SERPL CALCULATED.3IONS-SCNC: 9.1 MMOL/L (ref 5–15)
BUN SERPL-MCNC: 16 MG/DL (ref 8–23)
BUN/CREAT SERPL: 15.4 (ref 7–25)
CALCIUM SPEC-SCNC: 9.1 MG/DL (ref 8.6–10.5)
CHLORIDE SERPL-SCNC: 103 MMOL/L (ref 98–107)
CO2 SERPL-SCNC: 27.9 MMOL/L (ref 22–29)
CREAT SERPL-MCNC: 1.04 MG/DL (ref 0.76–1.27)
DEPRECATED RDW RBC AUTO: 45.1 FL (ref 37–54)
EGFRCR SERPLBLD CKD-EPI 2021: 77.2 ML/MIN/1.73
ERYTHROCYTE [DISTWIDTH] IN BLOOD BY AUTOMATED COUNT: 13.3 % (ref 12.3–15.4)
GLUCOSE SERPL-MCNC: 59 MG/DL (ref 65–99)
HCT VFR BLD AUTO: 44.1 % (ref 37.5–51)
HGB BLD-MCNC: 14.8 G/DL (ref 13–17.7)
INR PPP: 1.01 (ref 0.9–1.1)
MCH RBC QN AUTO: 30.7 PG (ref 26.6–33)
MCHC RBC AUTO-ENTMCNC: 33.6 G/DL (ref 31.5–35.7)
MCV RBC AUTO: 91.5 FL (ref 79–97)
PLATELET # BLD AUTO: 144 10*3/MM3 (ref 140–450)
PMV BLD AUTO: 11 FL (ref 6–12)
POTASSIUM SERPL-SCNC: 4.1 MMOL/L (ref 3.5–5.2)
PROTHROMBIN TIME: 13.2 SECONDS (ref 11.7–14.2)
QT INTERVAL: 411 MS
QTC INTERVAL: 401 MS
RBC # BLD AUTO: 4.82 10*6/MM3 (ref 4.14–5.8)
SODIUM SERPL-SCNC: 140 MMOL/L (ref 136–145)
WBC NRBC COR # BLD AUTO: 5.95 10*3/MM3 (ref 3.4–10.8)

## 2025-08-04 PROCEDURE — 93005 ELECTROCARDIOGRAM TRACING: CPT

## 2025-08-04 PROCEDURE — 85610 PROTHROMBIN TIME: CPT | Performed by: ORTHOPAEDIC SURGERY

## 2025-08-04 PROCEDURE — 85027 COMPLETE CBC AUTOMATED: CPT

## 2025-08-04 PROCEDURE — 80048 BASIC METABOLIC PNL TOTAL CA: CPT

## 2025-08-04 PROCEDURE — 36415 COLL VENOUS BLD VENIPUNCTURE: CPT

## 2025-08-04 PROCEDURE — 93010 ELECTROCARDIOGRAM REPORT: CPT | Performed by: INTERNAL MEDICINE

## 2025-08-11 ENCOUNTER — TELEPHONE (OUTPATIENT)
Dept: ONCOLOGY | Facility: CLINIC | Age: 70
End: 2025-08-11
Payer: MEDICARE

## 2025-08-14 ENCOUNTER — OFFICE VISIT (OUTPATIENT)
Dept: ORTHOPEDIC SURGERY | Facility: CLINIC | Age: 70
End: 2025-08-14
Payer: MEDICARE

## 2025-08-14 VITALS
TEMPERATURE: 98.4 F | HEART RATE: 93 BPM | OXYGEN SATURATION: 95 % | DIASTOLIC BLOOD PRESSURE: 76 MMHG | HEIGHT: 68 IN | SYSTOLIC BLOOD PRESSURE: 117 MMHG | BODY MASS INDEX: 32.34 KG/M2 | WEIGHT: 213.4 LBS

## 2025-08-14 DIAGNOSIS — R52 PAIN: Primary | ICD-10-CM

## 2025-08-14 RX ORDER — MESALAMINE 1.2 G/1
60 TABLET, DELAYED RELEASE ORAL
COMMUNITY
Start: 2025-08-07

## 2025-08-18 ENCOUNTER — ANESTHESIA EVENT (OUTPATIENT)
Dept: PERIOP | Facility: HOSPITAL | Age: 70
End: 2025-08-18
Payer: MEDICARE

## 2025-08-18 ENCOUNTER — HOSPITAL ENCOUNTER (OUTPATIENT)
Facility: HOSPITAL | Age: 70
Setting detail: HOSPITAL OUTPATIENT SURGERY
Discharge: HOME HEALTH | End: 2025-08-18
Attending: ORTHOPAEDIC SURGERY | Admitting: ORTHOPAEDIC SURGERY
Payer: MEDICARE

## 2025-08-18 ENCOUNTER — APPOINTMENT (OUTPATIENT)
Dept: GENERAL RADIOLOGY | Facility: HOSPITAL | Age: 70
End: 2025-08-18
Payer: MEDICARE

## 2025-08-18 ENCOUNTER — ANESTHESIA (OUTPATIENT)
Dept: PERIOP | Facility: HOSPITAL | Age: 70
End: 2025-08-18
Payer: MEDICARE

## 2025-08-18 VITALS
OXYGEN SATURATION: 99 % | TEMPERATURE: 97.7 F | SYSTOLIC BLOOD PRESSURE: 121 MMHG | HEART RATE: 61 BPM | DIASTOLIC BLOOD PRESSURE: 84 MMHG | RESPIRATION RATE: 14 BRPM

## 2025-08-18 DIAGNOSIS — Z96.651 S/P TOTAL KNEE REPLACEMENT, RIGHT: Primary | ICD-10-CM

## 2025-08-18 DIAGNOSIS — M17.11 PRIMARY OSTEOARTHRITIS OF RIGHT KNEE: ICD-10-CM

## 2025-08-18 PROCEDURE — C1776 JOINT DEVICE (IMPLANTABLE): HCPCS | Performed by: ORTHOPAEDIC SURGERY

## 2025-08-18 PROCEDURE — 25010000002 KETOROLAC TROMETHAMINE PER 15 MG: Performed by: ORTHOPAEDIC SURGERY

## 2025-08-18 PROCEDURE — 25810000003 LACTATED RINGERS PER 1000 ML: Performed by: STUDENT IN AN ORGANIZED HEALTH CARE EDUCATION/TRAINING PROGRAM

## 2025-08-18 PROCEDURE — 25010000002 VANCOMYCIN 10 G RECONSTITUTED SOLUTION: Performed by: NURSE PRACTITIONER

## 2025-08-18 PROCEDURE — 25810000003 LACTATED RINGERS PER 1000 ML

## 2025-08-18 PROCEDURE — 25010000002 CEFAZOLIN PER 500 MG: Performed by: NURSE PRACTITIONER

## 2025-08-18 PROCEDURE — 73560 X-RAY EXAM OF KNEE 1 OR 2: CPT

## 2025-08-18 PROCEDURE — 25010000002 ROPIVACAINE PER 1 MG: Performed by: ORTHOPAEDIC SURGERY

## 2025-08-18 PROCEDURE — 25010000002 MIDAZOLAM PER 1 MG: Performed by: STUDENT IN AN ORGANIZED HEALTH CARE EDUCATION/TRAINING PROGRAM

## 2025-08-18 PROCEDURE — 25010000002 LIDOCAINE 2% SOLUTION

## 2025-08-18 PROCEDURE — 25010000002 FENTANYL CITRATE (PF) 100 MCG/2ML SOLUTION

## 2025-08-18 PROCEDURE — 25010000002 FAMOTIDINE 10 MG/ML SOLUTION: Performed by: STUDENT IN AN ORGANIZED HEALTH CARE EDUCATION/TRAINING PROGRAM

## 2025-08-18 PROCEDURE — 25810000003 SODIUM CHLORIDE 0.9 % SOLUTION: Performed by: NURSE PRACTITIONER

## 2025-08-18 PROCEDURE — 25010000002 GLYCOPYRROLATE 1 MG/5ML SOLUTION

## 2025-08-18 PROCEDURE — 25010000002 MORPHINE PER 10 MG: Performed by: ORTHOPAEDIC SURGERY

## 2025-08-18 PROCEDURE — 25010000002 DEXAMETHASONE PER 1 MG

## 2025-08-18 PROCEDURE — 27447 TOTAL KNEE ARTHROPLASTY: CPT | Performed by: ORTHOPAEDIC SURGERY

## 2025-08-18 PROCEDURE — 27447 TOTAL KNEE ARTHROPLASTY: CPT | Performed by: NURSE PRACTITIONER

## 2025-08-18 PROCEDURE — C1713 ANCHOR/SCREW BN/BN,TIS/BN: HCPCS | Performed by: ORTHOPAEDIC SURGERY

## 2025-08-18 PROCEDURE — 97161 PT EVAL LOW COMPLEX 20 MIN: CPT

## 2025-08-18 PROCEDURE — 25010000002 HYDROMORPHONE PER 4 MG

## 2025-08-18 PROCEDURE — 25010000002 PROPOFOL 10 MG/ML EMULSION

## 2025-08-18 PROCEDURE — 25010000002 ROPIVACAINE PER 1 MG: Performed by: STUDENT IN AN ORGANIZED HEALTH CARE EDUCATION/TRAINING PROGRAM

## 2025-08-18 PROCEDURE — 25010000002 ACETAMINOPHEN 10 MG/ML SOLUTION

## 2025-08-18 PROCEDURE — 25010000002 ONDANSETRON PER 1 MG

## 2025-08-18 PROCEDURE — 25810000003 LACTATED RINGERS SOLUTION: Performed by: NURSE PRACTITIONER

## 2025-08-18 PROCEDURE — 25010000002 SUGAMMADEX 200 MG/2ML SOLUTION

## 2025-08-18 PROCEDURE — 97530 THERAPEUTIC ACTIVITIES: CPT

## 2025-08-18 PROCEDURE — 25010000002 EPINEPHRINE 1 MG/ML SOLUTION 30 ML VIAL: Performed by: ORTHOPAEDIC SURGERY

## 2025-08-18 DEVICE — LEGION CRUCIATE RETAINING OXINIUM FEMORAL SIZE 6 RIGHT
Type: IMPLANTABLE DEVICE | Site: KNEE | Status: FUNCTIONAL
Brand: LEGION

## 2025-08-18 DEVICE — GENESIS II BICONVEX PATELLA 32MM
Type: IMPLANTABLE DEVICE | Site: KNEE | Status: FUNCTIONAL
Brand: GENESIS II

## 2025-08-18 DEVICE — IMPLANTABLE DEVICE: Type: IMPLANTABLE DEVICE | Status: FUNCTIONAL

## 2025-08-18 DEVICE — GENESIS II NON-POROUS TIBIAL                                    BASEPLATE SIZE 6 RIGHT
Type: IMPLANTABLE DEVICE | Site: KNEE | Status: FUNCTIONAL
Brand: GENESIS II

## 2025-08-18 DEVICE — KNOTLESS TISSUE CONTROL DEVICE, UNDYED UNIDIRECTIONAL (ANTIBACTERIAL) SYNTHETIC ABSORBABLE DEVICE
Type: IMPLANTABLE DEVICE | Site: KNEE | Status: FUNCTIONAL
Brand: STRATAFIX

## 2025-08-18 DEVICE — PALACOS® R IS A FAST-CURING, RADIOPAQUE, POLY(METHYL METHACRYLATE)-BASED BONE CEMENT.PALACOS ® R CONTAINS THE X-RAY CONTRAST MEDIUM ZIRCONIUM DIOXIDE. TO IMPROVE VISIBILITY IN THE SURGICAL FIELD PALACOS ® R HAS BEEN COLOURED WITH CHLOROPHYLL (E141). THE BONE CEMENT IS PREPARED DIRECTLY BEFORE USE BY MIXING A POLYMER POWDER COMPONENT WITH A LIQUID MONOMER COMPONENT. A DUCTILE DOUGH FORMS WHICH CURES WITHIN A FEW MINUTES.
Type: IMPLANTABLE DEVICE | Site: KNEE | Status: FUNCTIONAL
Brand: PALACOS®

## 2025-08-18 DEVICE — LEGION CRUCIATE RETAINING HIGH                                    FLEX HIGHLY CROSS LINKED                                    POLYETHYLENE SIZE 5-6 11MM
Type: IMPLANTABLE DEVICE | Site: KNEE | Status: FUNCTIONAL
Brand: LEGION

## 2025-08-18 DEVICE — DEV CONTRL TISS STRATAFIX SYMM PDS PLUS VIL CT-1 60CM: Type: IMPLANTABLE DEVICE | Site: KNEE | Status: FUNCTIONAL

## 2025-08-18 RX ORDER — ROPIVACAINE HYDROCHLORIDE 5 MG/ML
INJECTION, SOLUTION EPIDURAL; INFILTRATION; PERINEURAL
Status: COMPLETED | OUTPATIENT
Start: 2025-08-18 | End: 2025-08-18

## 2025-08-18 RX ORDER — DROPERIDOL 2.5 MG/ML
0.62 INJECTION, SOLUTION INTRAMUSCULAR; INTRAVENOUS
Status: DISCONTINUED | OUTPATIENT
Start: 2025-08-18 | End: 2025-08-18 | Stop reason: HOSPADM

## 2025-08-18 RX ORDER — SODIUM CHLORIDE 0.9 % (FLUSH) 0.9 %
3 SYRINGE (ML) INJECTION EVERY 12 HOURS SCHEDULED
Status: DISCONTINUED | OUTPATIENT
Start: 2025-08-18 | End: 2025-08-18 | Stop reason: HOSPADM

## 2025-08-18 RX ORDER — HYDROCODONE BITARTRATE AND ACETAMINOPHEN 7.5; 325 MG/1; MG/1
1 TABLET ORAL EVERY 4 HOURS PRN
Status: CANCELLED | OUTPATIENT
Start: 2025-08-18 | End: 2025-08-25

## 2025-08-18 RX ORDER — LIDOCAINE HYDROCHLORIDE 20 MG/ML
INJECTION, SOLUTION INFILTRATION; PERINEURAL AS NEEDED
Status: DISCONTINUED | OUTPATIENT
Start: 2025-08-18 | End: 2025-08-18 | Stop reason: SURG

## 2025-08-18 RX ORDER — ONDANSETRON 4 MG/1
4 TABLET, ORALLY DISINTEGRATING ORAL EVERY 6 HOURS PRN
Status: DISCONTINUED | OUTPATIENT
Start: 2025-08-18 | End: 2025-08-18 | Stop reason: HOSPADM

## 2025-08-18 RX ORDER — PROPOFOL 10 MG/ML
VIAL (ML) INTRAVENOUS AS NEEDED
Status: DISCONTINUED | OUTPATIENT
Start: 2025-08-18 | End: 2025-08-18 | Stop reason: SURG

## 2025-08-18 RX ORDER — ACETAMINOPHEN 10 MG/ML
INJECTION, SOLUTION INTRAVENOUS AS NEEDED
Status: DISCONTINUED | OUTPATIENT
Start: 2025-08-18 | End: 2025-08-18 | Stop reason: SURG

## 2025-08-18 RX ORDER — ACETAMINOPHEN 325 MG/1
650 TABLET ORAL EVERY 6 HOURS PRN
Status: DISCONTINUED | OUTPATIENT
Start: 2025-08-18 | End: 2025-08-18 | Stop reason: HOSPADM

## 2025-08-18 RX ORDER — LABETALOL HYDROCHLORIDE 5 MG/ML
5 INJECTION, SOLUTION INTRAVENOUS
Status: DISCONTINUED | OUTPATIENT
Start: 2025-08-18 | End: 2025-08-18 | Stop reason: HOSPADM

## 2025-08-18 RX ORDER — FLUMAZENIL 0.1 MG/ML
0.2 INJECTION INTRAVENOUS AS NEEDED
Status: DISCONTINUED | OUTPATIENT
Start: 2025-08-18 | End: 2025-08-18 | Stop reason: HOSPADM

## 2025-08-18 RX ORDER — MAGNESIUM HYDROXIDE 1200 MG/15ML
LIQUID ORAL AS NEEDED
Status: DISCONTINUED | OUTPATIENT
Start: 2025-08-18 | End: 2025-08-18 | Stop reason: HOSPADM

## 2025-08-18 RX ORDER — PREGABALIN 75 MG/1
150 CAPSULE ORAL ONCE
Status: COMPLETED | OUTPATIENT
Start: 2025-08-18 | End: 2025-08-18

## 2025-08-18 RX ORDER — HYDROCODONE BITARTRATE AND ACETAMINOPHEN 7.5; 325 MG/1; MG/1
1 TABLET ORAL EVERY 4 HOURS PRN
Qty: 40 TABLET | Refills: 0 | Status: SHIPPED | OUTPATIENT
Start: 2025-08-18 | End: 2025-08-21 | Stop reason: SDUPTHER

## 2025-08-18 RX ORDER — LIDOCAINE HYDROCHLORIDE 10 MG/ML
0.5 INJECTION, SOLUTION INFILTRATION; PERINEURAL ONCE AS NEEDED
Status: DISCONTINUED | OUTPATIENT
Start: 2025-08-18 | End: 2025-08-18 | Stop reason: HOSPADM

## 2025-08-18 RX ORDER — FENTANYL CITRATE 50 UG/ML
INJECTION, SOLUTION INTRAMUSCULAR; INTRAVENOUS AS NEEDED
Status: DISCONTINUED | OUTPATIENT
Start: 2025-08-18 | End: 2025-08-18 | Stop reason: SURG

## 2025-08-18 RX ORDER — HYDROCODONE BITARTRATE AND ACETAMINOPHEN 7.5; 325 MG/1; MG/1
2 TABLET ORAL EVERY 4 HOURS PRN
Status: CANCELLED | OUTPATIENT
Start: 2025-08-18 | End: 2025-08-25

## 2025-08-18 RX ORDER — NALOXONE HCL 0.4 MG/ML
0.2 VIAL (ML) INJECTION AS NEEDED
Status: DISCONTINUED | OUTPATIENT
Start: 2025-08-18 | End: 2025-08-18 | Stop reason: HOSPADM

## 2025-08-18 RX ORDER — MELOXICAM 15 MG/1
15 TABLET ORAL ONCE
Status: COMPLETED | OUTPATIENT
Start: 2025-08-18 | End: 2025-08-18

## 2025-08-18 RX ORDER — ONDANSETRON 4 MG/1
4 TABLET, FILM COATED ORAL EVERY 8 HOURS PRN
Qty: 10 TABLET | Refills: 0 | Status: SHIPPED | OUTPATIENT
Start: 2025-08-18

## 2025-08-18 RX ORDER — ROCURONIUM BROMIDE 10 MG/ML
INJECTION, SOLUTION INTRAVENOUS AS NEEDED
Status: DISCONTINUED | OUTPATIENT
Start: 2025-08-18 | End: 2025-08-18 | Stop reason: SURG

## 2025-08-18 RX ORDER — HYDROMORPHONE HYDROCHLORIDE 1 MG/ML
0.5 INJECTION, SOLUTION INTRAMUSCULAR; INTRAVENOUS; SUBCUTANEOUS
Status: DISCONTINUED | OUTPATIENT
Start: 2025-08-18 | End: 2025-08-18 | Stop reason: HOSPADM

## 2025-08-18 RX ORDER — FAMOTIDINE 10 MG/ML
20 INJECTION, SOLUTION INTRAVENOUS ONCE
Status: COMPLETED | OUTPATIENT
Start: 2025-08-18 | End: 2025-08-18

## 2025-08-18 RX ORDER — SODIUM CHLORIDE 0.9 % (FLUSH) 0.9 %
3-10 SYRINGE (ML) INJECTION AS NEEDED
Status: DISCONTINUED | OUTPATIENT
Start: 2025-08-18 | End: 2025-08-18 | Stop reason: HOSPADM

## 2025-08-18 RX ORDER — ONDANSETRON 2 MG/ML
4 INJECTION INTRAMUSCULAR; INTRAVENOUS ONCE AS NEEDED
Status: DISCONTINUED | OUTPATIENT
Start: 2025-08-18 | End: 2025-08-18 | Stop reason: HOSPADM

## 2025-08-18 RX ORDER — HYDROCODONE BITARTRATE AND ACETAMINOPHEN 5; 325 MG/1; MG/1
1 TABLET ORAL ONCE AS NEEDED
Status: DISCONTINUED | OUTPATIENT
Start: 2025-08-18 | End: 2025-08-18 | Stop reason: HOSPADM

## 2025-08-18 RX ORDER — ATROPINE SULFATE 0.4 MG/ML
0.4 INJECTION, SOLUTION INTRAMUSCULAR; INTRAVENOUS; SUBCUTANEOUS ONCE AS NEEDED
Status: DISCONTINUED | OUTPATIENT
Start: 2025-08-18 | End: 2025-08-18 | Stop reason: HOSPADM

## 2025-08-18 RX ORDER — OXYCODONE AND ACETAMINOPHEN 7.5; 325 MG/1; MG/1
1 TABLET ORAL EVERY 4 HOURS PRN
Status: DISCONTINUED | OUTPATIENT
Start: 2025-08-18 | End: 2025-08-18 | Stop reason: HOSPADM

## 2025-08-18 RX ORDER — ONDANSETRON 2 MG/ML
INJECTION INTRAMUSCULAR; INTRAVENOUS AS NEEDED
Status: DISCONTINUED | OUTPATIENT
Start: 2025-08-18 | End: 2025-08-18 | Stop reason: SURG

## 2025-08-18 RX ORDER — TRANEXAMIC ACID 100 MG/ML
INJECTION, SOLUTION INTRAVENOUS AS NEEDED
Status: DISCONTINUED | OUTPATIENT
Start: 2025-08-18 | End: 2025-08-18 | Stop reason: SURG

## 2025-08-18 RX ORDER — HYDROCODONE BITARTRATE AND ACETAMINOPHEN 7.5; 325 MG/1; MG/1
1 TABLET ORAL EVERY 4 HOURS PRN
Status: DISCONTINUED | OUTPATIENT
Start: 2025-08-18 | End: 2025-08-18 | Stop reason: HOSPADM

## 2025-08-18 RX ORDER — DEXAMETHASONE SODIUM PHOSPHATE 4 MG/ML
INJECTION, SOLUTION INTRA-ARTICULAR; INTRALESIONAL; INTRAMUSCULAR; INTRAVENOUS; SOFT TISSUE AS NEEDED
Status: DISCONTINUED | OUTPATIENT
Start: 2025-08-18 | End: 2025-08-18 | Stop reason: SURG

## 2025-08-18 RX ORDER — SODIUM CHLORIDE, SODIUM LACTATE, POTASSIUM CHLORIDE, CALCIUM CHLORIDE 600; 310; 30; 20 MG/100ML; MG/100ML; MG/100ML; MG/100ML
9 INJECTION, SOLUTION INTRAVENOUS CONTINUOUS
Status: DISCONTINUED | OUTPATIENT
Start: 2025-08-18 | End: 2025-08-18 | Stop reason: HOSPADM

## 2025-08-18 RX ORDER — PROMETHAZINE HYDROCHLORIDE 25 MG/1
12.5 TABLET ORAL EVERY 4 HOURS PRN
Status: DISCONTINUED | OUTPATIENT
Start: 2025-08-18 | End: 2025-08-18 | Stop reason: HOSPADM

## 2025-08-18 RX ORDER — MIDAZOLAM HYDROCHLORIDE 1 MG/ML
0.5 INJECTION, SOLUTION INTRAMUSCULAR; INTRAVENOUS
Status: DISCONTINUED | OUTPATIENT
Start: 2025-08-18 | End: 2025-08-18 | Stop reason: HOSPADM

## 2025-08-18 RX ORDER — POLYETHYLENE GLYCOL 3350 17 G/17G
17 POWDER, FOR SOLUTION ORAL 2 TIMES DAILY
Qty: 238 G | Refills: 0 | Status: SHIPPED | OUTPATIENT
Start: 2025-08-18 | End: 2025-08-25

## 2025-08-18 RX ORDER — IPRATROPIUM BROMIDE AND ALBUTEROL SULFATE 2.5; .5 MG/3ML; MG/3ML
3 SOLUTION RESPIRATORY (INHALATION) ONCE AS NEEDED
Status: DISCONTINUED | OUTPATIENT
Start: 2025-08-18 | End: 2025-08-18 | Stop reason: HOSPADM

## 2025-08-18 RX ORDER — TAMSULOSIN HYDROCHLORIDE 0.4 MG/1
0.4 CAPSULE ORAL ONCE
Status: COMPLETED | OUTPATIENT
Start: 2025-08-18 | End: 2025-08-18

## 2025-08-18 RX ORDER — GLYCOPYRROLATE 0.2 MG/ML
INJECTION INTRAMUSCULAR; INTRAVENOUS AS NEEDED
Status: DISCONTINUED | OUTPATIENT
Start: 2025-08-18 | End: 2025-08-18 | Stop reason: SURG

## 2025-08-18 RX ORDER — PROMETHAZINE HYDROCHLORIDE 25 MG/1
25 TABLET ORAL ONCE AS NEEDED
Status: DISCONTINUED | OUTPATIENT
Start: 2025-08-18 | End: 2025-08-18 | Stop reason: HOSPADM

## 2025-08-18 RX ORDER — DIPHENHYDRAMINE HYDROCHLORIDE 50 MG/ML
12.5 INJECTION, SOLUTION INTRAMUSCULAR; INTRAVENOUS
Status: DISCONTINUED | OUTPATIENT
Start: 2025-08-18 | End: 2025-08-18 | Stop reason: HOSPADM

## 2025-08-18 RX ORDER — FENTANYL CITRATE 50 UG/ML
50 INJECTION, SOLUTION INTRAMUSCULAR; INTRAVENOUS
Status: DISCONTINUED | OUTPATIENT
Start: 2025-08-18 | End: 2025-08-18 | Stop reason: HOSPADM

## 2025-08-18 RX ORDER — PANTOPRAZOLE SODIUM 40 MG/1
40 TABLET, DELAYED RELEASE ORAL DAILY
Qty: 14 TABLET | Refills: 0 | Status: CANCELLED | OUTPATIENT
Start: 2025-08-18 | End: 2025-09-01

## 2025-08-18 RX ORDER — VANCOMYCIN/0.9 % SOD CHLORIDE 1.5G/250ML
15 PLASTIC BAG, INJECTION (ML) INTRAVENOUS ONCE
Status: COMPLETED | OUTPATIENT
Start: 2025-08-18 | End: 2025-08-18

## 2025-08-18 RX ORDER — PROMETHAZINE HYDROCHLORIDE 25 MG/1
25 SUPPOSITORY RECTAL ONCE AS NEEDED
Status: DISCONTINUED | OUTPATIENT
Start: 2025-08-18 | End: 2025-08-18 | Stop reason: HOSPADM

## 2025-08-18 RX ORDER — FENTANYL CITRATE 50 UG/ML
50 INJECTION, SOLUTION INTRAMUSCULAR; INTRAVENOUS ONCE AS NEEDED
Status: DISCONTINUED | OUTPATIENT
Start: 2025-08-18 | End: 2025-08-18 | Stop reason: HOSPADM

## 2025-08-18 RX ORDER — HYDRALAZINE HYDROCHLORIDE 20 MG/ML
5 INJECTION INTRAMUSCULAR; INTRAVENOUS
Status: DISCONTINUED | OUTPATIENT
Start: 2025-08-18 | End: 2025-08-18 | Stop reason: HOSPADM

## 2025-08-18 RX ORDER — SODIUM CHLORIDE, SODIUM LACTATE, POTASSIUM CHLORIDE, CALCIUM CHLORIDE 600; 310; 30; 20 MG/100ML; MG/100ML; MG/100ML; MG/100ML
INJECTION, SOLUTION INTRAVENOUS CONTINUOUS PRN
Status: DISCONTINUED | OUTPATIENT
Start: 2025-08-18 | End: 2025-08-18 | Stop reason: SURG

## 2025-08-18 RX ORDER — EPHEDRINE SULFATE 50 MG/ML
5 INJECTION, SOLUTION INTRAVENOUS ONCE AS NEEDED
Status: DISCONTINUED | OUTPATIENT
Start: 2025-08-18 | End: 2025-08-18 | Stop reason: HOSPADM

## 2025-08-18 RX ADMIN — FENTANYL CITRATE 50 MCG: 50 INJECTION, SOLUTION INTRAMUSCULAR; INTRAVENOUS at 10:59

## 2025-08-18 RX ADMIN — FAMOTIDINE 20 MG: 10 INJECTION INTRAVENOUS at 09:11

## 2025-08-18 RX ADMIN — SODIUM CHLORIDE, POTASSIUM CHLORIDE, SODIUM LACTATE AND CALCIUM CHLORIDE 9 ML/HR: 600; 310; 30; 20 INJECTION, SOLUTION INTRAVENOUS at 09:35

## 2025-08-18 RX ADMIN — HYDROCODONE BITARTRATE AND ACETAMINOPHEN 1 TABLET: 7.5; 325 TABLET ORAL at 12:27

## 2025-08-18 RX ADMIN — GLYCOPYRROLATE 0.2 MCG: 0.2 INJECTION INTRAMUSCULAR; INTRAVENOUS at 10:34

## 2025-08-18 RX ADMIN — ROCURONIUM BROMIDE 100 MG: 10 INJECTION INTRAVENOUS at 10:26

## 2025-08-18 RX ADMIN — CEFAZOLIN 2 G: 2 INJECTION, POWDER, FOR SOLUTION INTRAMUSCULAR; INTRAVENOUS at 10:13

## 2025-08-18 RX ADMIN — PROPOFOL 125 MCG/KG/MIN: 10 INJECTION, EMULSION INTRAVENOUS at 10:30

## 2025-08-18 RX ADMIN — TRANEXAMIC ACID 1000 MG: 100 INJECTION INTRAVENOUS at 11:25

## 2025-08-18 RX ADMIN — HYDROMORPHONE HYDROCHLORIDE 0.5 MG: 1 INJECTION, SOLUTION INTRAMUSCULAR; INTRAVENOUS; SUBCUTANEOUS at 12:50

## 2025-08-18 RX ADMIN — SODIUM CHLORIDE, POTASSIUM CHLORIDE, SODIUM LACTATE AND CALCIUM CHLORIDE 500 ML: 600; 310; 30; 20 INJECTION, SOLUTION INTRAVENOUS at 09:10

## 2025-08-18 RX ADMIN — PROPOFOL 150 MG: 10 INJECTION, EMULSION INTRAVENOUS at 10:23

## 2025-08-18 RX ADMIN — LIDOCAINE HYDROCHLORIDE 5 ML: 20 INJECTION, SOLUTION INFILTRATION; PERINEURAL at 10:23

## 2025-08-18 RX ADMIN — SODIUM CHLORIDE, POTASSIUM CHLORIDE, SODIUM LACTATE AND CALCIUM CHLORIDE 9 ML/HR: 600; 310; 30; 20 INJECTION, SOLUTION INTRAVENOUS at 12:27

## 2025-08-18 RX ADMIN — OXYCODONE AND ACETAMINOPHEN 1 TABLET: 7.5; 325 TABLET ORAL at 14:42

## 2025-08-18 RX ADMIN — FENTANYL CITRATE 50 MCG: 50 INJECTION, SOLUTION INTRAMUSCULAR; INTRAVENOUS at 11:53

## 2025-08-18 RX ADMIN — ONDANSETRON 4 MG: 2 INJECTION, SOLUTION INTRAMUSCULAR; INTRAVENOUS at 10:30

## 2025-08-18 RX ADMIN — SODIUM CHLORIDE, POTASSIUM CHLORIDE, SODIUM LACTATE AND CALCIUM CHLORIDE: 600; 310; 30; 20 INJECTION, SOLUTION INTRAVENOUS at 10:19

## 2025-08-18 RX ADMIN — SUGAMMADEX 290 MG: 100 INJECTION, SOLUTION INTRAVENOUS at 11:53

## 2025-08-18 RX ADMIN — ACETAMINOPHEN 1000 MG: 1000 INJECTION INTRAVENOUS at 10:28

## 2025-08-18 RX ADMIN — PREGABALIN 75 MG: 75 CAPSULE ORAL at 09:35

## 2025-08-18 RX ADMIN — DEXAMETHASONE SODIUM PHOSPHATE 8 MG: 4 INJECTION, SOLUTION INTRA-ARTICULAR; INTRALESIONAL; INTRAMUSCULAR; INTRAVENOUS; SOFT TISSUE at 10:30

## 2025-08-18 RX ADMIN — TAMSULOSIN HYDROCHLORIDE 0.4 MG: 0.4 CAPSULE ORAL at 10:07

## 2025-08-18 RX ADMIN — FENTANYL CITRATE 100 MCG: 50 INJECTION, SOLUTION INTRAMUSCULAR; INTRAVENOUS at 10:23

## 2025-08-18 RX ADMIN — MELOXICAM 15 MG: 15 TABLET ORAL at 09:34

## 2025-08-18 RX ADMIN — VANCOMYCIN HYDROCHLORIDE 1500 MG: 10 INJECTION, POWDER, LYOPHILIZED, FOR SOLUTION INTRAVENOUS at 09:36

## 2025-08-18 RX ADMIN — ROPIVACAINE HYDROCHLORIDE 15 ML: 5 INJECTION EPIDURAL; INFILTRATION; PERINEURAL at 09:38

## 2025-08-18 RX ADMIN — MIDAZOLAM 1 MG: 1 INJECTION INTRAMUSCULAR; INTRAVENOUS at 09:33

## 2025-08-20 ENCOUNTER — TELEPHONE (OUTPATIENT)
Dept: PREOP | Facility: HOSPITAL | Age: 70
End: 2025-08-20
Payer: MEDICARE

## 2025-08-21 DIAGNOSIS — Z96.651 S/P TOTAL KNEE REPLACEMENT, RIGHT: ICD-10-CM

## 2025-08-21 RX ORDER — HYDROCODONE BITARTRATE AND ACETAMINOPHEN 7.5; 325 MG/1; MG/1
1 TABLET ORAL EVERY 4 HOURS PRN
Qty: 40 TABLET | Refills: 0 | Status: SHIPPED | OUTPATIENT
Start: 2025-08-21

## (undated) DEVICE — SOL IRR NACL 0.9PCT 3000ML

## (undated) DEVICE — TRAP FLD MINIVAC MEGADYNE 100ML

## (undated) DEVICE — APPL DURAPREP IODOPHOR APL 26ML

## (undated) DEVICE — 3M™ IOBAN™ 2 ANTIMICROBIAL INCISE DRAPE 6640EZ: Brand: IOBAN™ 2

## (undated) DEVICE — PINNACLE INTRODUCER SHEATH: Brand: PINNACLE

## (undated) DEVICE — SNAR POLYP HOTSNARE/BRAIDED OVL/MINI 7F 2.8X10MM 230CM 1P/U

## (undated) DEVICE — BITEBLOCK ENDO W/STRAP 60F A/ LF DISP

## (undated) DEVICE — ANTIBACTERIAL UNDYED BRAIDED (POLYGLACTIN 910), SYNTHETIC ABSORBABLE SUTURE: Brand: COATED VICRYL

## (undated) DEVICE — NEEDLE, QUINCKE 22GX3.5": Brand: MEDLINE INDUSTRIES, INC.

## (undated) DEVICE — SKIN PREP TRAY 4 COMPARTM TRAY: Brand: MEDLINE INDUSTRIES, INC.

## (undated) DEVICE — PATIENT RETURN ELECTRODE, SINGLE-USE, CONTACT QUALITY MONITORING, ADULT, WITH 9FT CORD, FOR PATIENTS WEIGING OVER 33LBS. (15KG): Brand: MEGADYNE

## (undated) DEVICE — DRSNG SURESITE WNDW 2.38X2.75

## (undated) DEVICE — SYS SKIN EXOFIN WND CLS 4X22CM

## (undated) DEVICE — GLV SURG SENSICARE W/ALOE PF LF 7.5 STRL

## (undated) DEVICE — KT DRN EVAC WND PVC PCH WTROC RND 10F400

## (undated) DEVICE — BNDG,ELSTC,MATRIX,STRL,6"X5YD,LF,HOOK&LP: Brand: MEDLINE

## (undated) DEVICE — THE STERILE LIGHT HANDLE COVER IS USED WITH STERIS SURGICAL LIGHTING AND VISUALIZATION SYSTEMS.

## (undated) DEVICE — CATH DIAG IMPULSE PIG .056 6F 110CM

## (undated) DEVICE — 450 ML BOTTLE OF 0.05% CHLORHEXIDINE GLUCONATE IN 99.95% STERILE WATER FOR IRRIGATION, USP AND APPLICATOR.: Brand: IRRISEPT ANTIMICROBIAL WOUND LAVAGE

## (undated) DEVICE — SINGLE-USE BIOPSY FORCEPS: Brand: RADIAL JAW 4

## (undated) DEVICE — INTENDED TO SUPPORT AND MAINTAIN THE POSITION OF AN ANESTHETIZED PATIENT DURING SURGERY: Brand: HERMANTOR XL PINK KNEE POSITIONING PAD

## (undated) DEVICE — YANKAUER,BULB TIP,W/O VENT,RIGID,STERILE: Brand: MEDLINE

## (undated) DEVICE — PK KN TOTL 40

## (undated) DEVICE — GLV SURG SENSICARE PI MIC PF SZ7 LF STRL

## (undated) DEVICE — TRAP WIDEEYE POLYP

## (undated) DEVICE — PREP SOL POVIDONE/IODINE BT 4OZ

## (undated) DEVICE — GLV SURG SENSICARE PI PF LF 7 GRN STRL

## (undated) DEVICE — GLV SURG SENSICARE W/ALOE PF LF 8 STRL

## (undated) DEVICE — ELECTRD DEFIB M/FUNC PROPADZ RADIOL 2PK

## (undated) DEVICE — PK TRY HEART CATH 50

## (undated) DEVICE — UNDERGLV SURG BIOGEL INDICATOR LF PF 7.5

## (undated) DEVICE — CATH DIAG IMPULSE FL4 6F 100CM

## (undated) DEVICE — DUAL CUT SAGITTAL BLADE

## (undated) DEVICE — SUT VIC 1 CT1 36IN J947H

## (undated) DEVICE — CATH DIAG IMPULSE FR4 6F 100CM

## (undated) DEVICE — UNDERCAST PADDING: Brand: DEROYAL

## (undated) DEVICE — PK ENDO GI 50

## (undated) DEVICE — DGW .035 MC J3MM 150CM T H AMP: Brand: EMERALD